# Patient Record
Sex: MALE | Race: WHITE | Employment: FULL TIME | URBAN - METROPOLITAN AREA
[De-identification: names, ages, dates, MRNs, and addresses within clinical notes are randomized per-mention and may not be internally consistent; named-entity substitution may affect disease eponyms.]

---

## 2018-09-07 ENCOUNTER — HOSPITAL ENCOUNTER (EMERGENCY)
Age: 28
Discharge: HOME | End: 2018-09-07
Attending: EMERGENCY MEDICINE
Payer: COMMERCIAL

## 2018-09-07 VITALS
SYSTOLIC BLOOD PRESSURE: 102 MMHG | DIASTOLIC BLOOD PRESSURE: 70 MMHG | WEIGHT: 130 LBS | OXYGEN SATURATION: 99 % | HEART RATE: 122 BPM | BODY MASS INDEX: 18.2 KG/M2 | HEIGHT: 71 IN | TEMPERATURE: 97.7 F | RESPIRATION RATE: 16 BRPM

## 2018-09-07 DIAGNOSIS — K04.7 DENTAL INFECTION: Primary | ICD-10-CM

## 2018-09-07 DIAGNOSIS — J06.9 ACUTE UPPER RESPIRATORY INFECTION: ICD-10-CM

## 2018-09-07 PROCEDURE — 99282 EMERGENCY DEPT VISIT SF MDM: CPT

## 2018-09-07 RX ORDER — BROMPHENIRAMINE MALEATE, PSEUDOEPHEDRINE HYDROCHLORIDE, AND DEXTROMETHORPHAN HYDROBROMIDE 2; 30; 10 MG/5ML; MG/5ML; MG/5ML
5 SYRUP ORAL 4 TIMES DAILY PRN
Qty: 120 ML | Refills: 0 | Status: SHIPPED | OUTPATIENT
Start: 2018-09-07 | End: 2019-03-06

## 2018-09-07 RX ORDER — IBUPROFEN 800 MG/1
800 TABLET ORAL EVERY 8 HOURS PRN
Qty: 30 TABLET | Refills: 0 | Status: SHIPPED | OUTPATIENT
Start: 2018-09-07 | End: 2019-06-12

## 2018-09-07 RX ORDER — AMOXICILLIN 500 MG/1
500 CAPSULE ORAL 3 TIMES DAILY
Qty: 30 CAPSULE | Refills: 0 | Status: SHIPPED | OUTPATIENT
Start: 2018-09-07 | End: 2018-09-17

## 2018-09-07 ASSESSMENT — PAIN DESCRIPTION - PAIN TYPE: TYPE: ACUTE PAIN

## 2018-09-07 ASSESSMENT — ENCOUNTER SYMPTOMS
COUGH: 1
SORE THROAT: 0
VOMITING: 0
ABDOMINAL PAIN: 0
DIARRHEA: 0
WHEEZING: 0
SHORTNESS OF BREATH: 0
RHINORRHEA: 1
EYE DISCHARGE: 0
BACK PAIN: 0
EYE REDNESS: 0
SINUS PRESSURE: 0
NAUSEA: 0
EYE PAIN: 0

## 2018-09-07 ASSESSMENT — PAIN DESCRIPTION - LOCATION: LOCATION: TEETH

## 2018-09-07 ASSESSMENT — PAIN DESCRIPTION - DESCRIPTORS: DESCRIPTORS: ACHING

## 2018-09-07 ASSESSMENT — PAIN DESCRIPTION - ORIENTATION: ORIENTATION: LEFT

## 2018-09-07 ASSESSMENT — PAIN SCALES - GENERAL: PAINLEVEL_OUTOF10: 5

## 2018-09-07 NOTE — ED PROVIDER NOTES
The history is provided by the patient. URI   Presenting symptoms: congestion, cough and rhinorrhea    Presenting symptoms: no ear pain, no fever and no sore throat    Severity:  Moderate  Onset quality:  Gradual  Duration:  2 weeks  Chronicity:  New  Associated symptoms: no arthralgias, no headaches and no wheezing    Dental Pain   Location:  Upper  Upper teeth location:  15/ILAN 2nd molar and 16/ILAN 3rd molar  Quality:  Aching  Severity:  Moderate  Onset quality:  Gradual  Duration:  3 weeks  Progression:  Worsening  Context: abscess    Associated symptoms: congestion    Associated symptoms: no fever and no headaches        Review of Systems   Constitutional: Negative for chills and fever. HENT: Positive for congestion and rhinorrhea. Negative for ear pain, sinus pressure and sore throat. Eyes: Negative for pain, discharge and redness. Respiratory: Positive for cough. Negative for shortness of breath and wheezing. Cardiovascular: Negative for chest pain. Gastrointestinal: Negative for abdominal pain, diarrhea, nausea and vomiting. Genitourinary: Negative for dysuria and frequency. Musculoskeletal: Negative for arthralgias and back pain. Skin: Negative for rash and wound. Neurological: Negative for weakness and headaches. Hematological: Negative for adenopathy. All other systems reviewed and are negative. Physical Exam   Constitutional: He is oriented to person, place, and time. He appears well-developed and well-nourished. HENT:   Head: Normocephalic and atraumatic. Right Ear: Hearing, tympanic membrane and external ear normal.   Left Ear: Hearing, tympanic membrane and external ear normal.   Nose: Mucosal edema and rhinorrhea present. Right sinus exhibits no maxillary sinus tenderness and no frontal sinus tenderness. Left sinus exhibits no maxillary sinus tenderness and no frontal sinus tenderness.    Mouth/Throat: Uvula is midline, oropharynx is clear and moist and mucous notes within the ED encounter and vital signs as below have been reviewed. /70   Pulse 122   Temp 97.7 °F (36.5 °C)   Resp 16   Ht 5' 11\" (1.803 m)   Wt 130 lb (59 kg)   SpO2 99%   BMI 18.13 kg/m²   Oxygen Saturation Interpretation: Normal      ------------------------------------------ PROGRESS NOTES ------------------------------------------  I have spoken with the patient and discussed todays results, in addition to providing specific details for the plan of care and counseling regarding the diagnosis and prognosis. Their questions are answered at this time and they are agreeable with the plan. I discussed at length with them reasons for immediate return here for re evaluation. They will followup with primary care by calling their office tomorrow. --------------------------------- ADDITIONAL PROVIDER NOTES ---------------------------------  At this time the patient is without objective evidence of an acute process requiring hospitalization or inpatient management. They have remained hemodynamically stable throughout their entire ED visit and are stable for discharge with outpatient follow-up. The plan has been discussed in detail and they are aware of the specific conditions for emergent return, as well as the importance of follow-up. New Prescriptions    AMOXICILLIN (AMOXIL) 500 MG CAPSULE    Take 1 capsule by mouth 3 times daily for 10 days    BROMPHENIRAMINE-PSEUDOEPHEDRINE-DM 30-2-10 MG/5ML SYRUP    Take 5 mLs by mouth 4 times daily as needed for Congestion or Cough    IBUPROFEN (IBU) 800 MG TABLET    Take 1 tablet by mouth every 8 hours as needed for Pain       Diagnosis:  1. Dental infection    2. Acute upper respiratory infection        Disposition:  Patient's disposition: Discharge to home  Patient's condition is stable.          Ravindra Osborne MD  09/07/18 [id number]

## 2018-11-26 ENCOUNTER — HOSPITAL ENCOUNTER (EMERGENCY)
Age: 28
Discharge: HOME | End: 2018-11-26

## 2018-11-26 VITALS
BODY MASS INDEX: 19.22 KG/M2 | OXYGEN SATURATION: 99 % | TEMPERATURE: 98.1 F | HEART RATE: 95 BPM | RESPIRATION RATE: 15 BRPM | WEIGHT: 137.3 LBS | SYSTOLIC BLOOD PRESSURE: 133 MMHG | DIASTOLIC BLOOD PRESSURE: 72 MMHG | HEIGHT: 71 IN

## 2018-11-26 DIAGNOSIS — R20.2 INTERMITTENT TINGLING SENSATION OF RIGHT HAND AND FOOT: Primary | ICD-10-CM

## 2018-11-26 PROCEDURE — 99283 EMERGENCY DEPT VISIT LOW MDM: CPT

## 2018-11-26 ASSESSMENT — PAIN DESCRIPTION - LOCATION: LOCATION: BACK

## 2018-11-26 ASSESSMENT — PAIN DESCRIPTION - PAIN TYPE: TYPE: CHRONIC PAIN

## 2018-11-26 ASSESSMENT — PAIN SCALES - GENERAL: PAINLEVEL_OUTOF10: 5

## 2018-11-26 NOTE — ED PROVIDER NOTES
[facility]     Department of Emergency Medicine   ED  Provider Note  Admit Date/RoomTime: 11/26/2018  5:48 PM  ED Room: [place] [address]   Chief Complaint:   Back Pain (chronic back pain, dx with sciatica) and Numbness (right hand x 1 week)    History of Present Illness   Source of history provided by:  patient. History/Exam Limitations: none. Leslie Swanson is a 29 y.o. old male who has a past medical history of:   Past Medical History:   Diagnosis Date    Bee sting allergy     presents to the emergency department by private vehicle, for acute, like an electric shock right sided upper extremity pain with radiation, right hand, for 1 week(s) prior to arrival. The pain was caused by no known injury, but states that he notices it most when he is in the cold environment at work. He is a smoker as well. No known history of Raynaud's. He has a history of Chronic back pain issues with sciatica of the lumbar spine but no known cervical disc disease or cervical spinal abnormalities. No recent head injury or trauma, neck pain or trauma. No focal or unilateral weakness. No acute vision, speech, hearing changes, No issues with swallowing, balance or gait. Since onset the symptoms have been intermittent and mild in severity. No recent or prior history of back surgery, interventional back procedures such as epidural or facet injections, fevers or chills, genital or perianal paresthesia, lower extremity paresthesia, incontinence of bowel or bladder, or history of IV drug use. There have been associated symptoms of tingling of hand and denies any weakness, numbness, incontinence, dysuria, hematuria, abdominal pain, fever, hx cancer, weight loss, recent steroid use, morning stiffness, leg weakness, new numbness, new weakness, new tingling, abdominal swelling, chest pain, pelvic pain or perianal numbness. The the pain is aggraveated by cold temperatures and relieved by warmer temperatures.       ROS   Pertinent positives and negatives are stated within HPI, all other systems reviewed and are negative. History reviewed. No pertinent surgical history. Social History:  reports that he has been smoking Cigarettes. He has a 8.00 pack-year smoking history. He has never used smokeless tobacco. He reports that he drinks about 1.2 oz of alcohol per week . He reports that he does not use drugs. Family History: family history is not on file. Allergies: Patient has no known allergies. Physical Exam           ED Triage Vitals [11/26/18 1746]   BP Temp Temp src Pulse Resp SpO2 Height Weight   133/72 98.1 °F (36.7 °C) -- 95 15 99 % 5' 11\" (1.803 m) 137 lb 4.8 oz (62.3 kg)      Oxygen Saturation Interpretation: Normal.    Constitutional:  Alert, development consistent with age. HEENT:  NC/NT. No depressed skull fracture, scalp tenderness, laceration, hematoma or open wound. PERRLA. EOMI, sclera anicteric,  Airway patent. Neck:  Normal ROM. No posterior midline tenderness, no bilateral PSM tenderness. Supple. No meningeal signs/ negative Kernig and Brudzinski signs. Negative bilateral Spurling's exam.  Respiratory:  Clear to auscultation and breath sounds equal. Respiratory pattern regular easy and unlabored. CV:  Regular rate and rhythm, normal heart sounds, without pathological murmurs, ectopy, gallops, or rubs. GI:  Abdomen Soft, nontender, good bowel sounds. No firm or pulsatile mass. Back:  No costovertebral, paravertebral, intervertebral, or vertebral tenderness or spasm. Pelvis:  Non-tender, Stable to palpation. Extremities: Bilateral upper and lower extremities have normal neurovascular examinations with good distal pulses and capillary refill. Normal distal temperature. Normal bilateral Eduin's test. All reflexes are tested and intact, symmetrical in all extremities upper and lower No tenderness or edema noted. Integument:  Normal turgor. Warm, dry, without visible rash, unless noted elsewhere.   Lymphatics: No

## 2018-11-29 ENCOUNTER — HOSPITAL ENCOUNTER (OUTPATIENT)
Age: 28
Discharge: HOME | End: 2018-12-01

## 2018-11-29 ENCOUNTER — HOSPITAL ENCOUNTER (OUTPATIENT)
Dept: GENERAL RADIOLOGY | Age: 28
Discharge: HOME | End: 2018-12-01

## 2018-11-29 DIAGNOSIS — M54.12 CERVICAL RADICULITIS: ICD-10-CM

## 2018-11-29 PROCEDURE — 72040 X-RAY EXAM NECK SPINE 2-3 VW: CPT

## 2019-03-06 ENCOUNTER — HOSPITAL ENCOUNTER (EMERGENCY)
Age: 29
Discharge: HOME | End: 2019-03-07
Attending: EMERGENCY MEDICINE
Payer: MEDICARE

## 2019-03-06 DIAGNOSIS — F19.10 POLYSUBSTANCE ABUSE (HCC): ICD-10-CM

## 2019-03-06 DIAGNOSIS — F32.A DEPRESSION, UNSPECIFIED DEPRESSION TYPE: Primary | ICD-10-CM

## 2019-03-06 PROCEDURE — 99285 EMERGENCY DEPT VISIT HI MDM: CPT

## 2019-03-06 ASSESSMENT — ENCOUNTER SYMPTOMS
EYE REDNESS: 0
WHEEZING: 0
VOMITING: 0
SINUS PRESSURE: 0
SORE THROAT: 0
EYE DISCHARGE: 0
NAUSEA: 0
DIARRHEA: 0
BACK PAIN: 0
ABDOMINAL PAIN: 0
SHORTNESS OF BREATH: 0
COUGH: 0
EYE PAIN: 0

## 2019-03-07 VITALS
HEIGHT: 71 IN | SYSTOLIC BLOOD PRESSURE: 116 MMHG | BODY MASS INDEX: 19.6 KG/M2 | TEMPERATURE: 97.6 F | OXYGEN SATURATION: 99 % | WEIGHT: 140 LBS | HEART RATE: 89 BPM | RESPIRATION RATE: 18 BRPM | DIASTOLIC BLOOD PRESSURE: 72 MMHG

## 2019-03-07 LAB
ACETAMINOPHEN LEVEL: <5 MCG/ML (ref 10–30)
ALBUMIN SERPL-MCNC: 3.8 G/DL (ref 3.5–5.2)
ALP BLD-CCNC: 79 U/L (ref 40–129)
ALT SERPL-CCNC: 15 U/L (ref 0–40)
AMPHETAMINE SCREEN, URINE: POSITIVE
ANION GAP SERPL CALCULATED.3IONS-SCNC: 10 MMOL/L (ref 7–16)
AST SERPL-CCNC: 15 U/L (ref 0–39)
BACTERIA: NORMAL /HPF
BARBITURATE SCREEN URINE: NOT DETECTED
BASOPHILS ABSOLUTE: 0.03 E9/L (ref 0–0.2)
BASOPHILS RELATIVE PERCENT: 0.3 % (ref 0–2)
BENZODIAZEPINE SCREEN, URINE: POSITIVE
BILIRUB SERPL-MCNC: 0.5 MG/DL (ref 0–1.2)
BILIRUBIN URINE: NEGATIVE
BLOOD, URINE: NEGATIVE
BUN BLDV-MCNC: 12 MG/DL (ref 6–20)
CALCIUM SERPL-MCNC: 9.2 MG/DL (ref 8.6–10.2)
CANNABINOID SCREEN URINE: POSITIVE
CHLORIDE BLD-SCNC: 103 MMOL/L (ref 98–107)
CLARITY: CLEAR
CO2: 30 MMOL/L (ref 22–29)
COCAINE METABOLITE SCREEN URINE: NOT DETECTED
COLOR: YELLOW
CREAT SERPL-MCNC: 0.7 MG/DL (ref 0.7–1.2)
EOSINOPHILS ABSOLUTE: 0.17 E9/L (ref 0.05–0.5)
EOSINOPHILS RELATIVE PERCENT: 1.8 % (ref 0–6)
ETHANOL: <10 MG/DL (ref 0–0.08)
GFR AFRICAN AMERICAN: >60
GFR NON-AFRICAN AMERICAN: >60 ML/MIN/1.73
GLUCOSE BLD-MCNC: 106 MG/DL (ref 74–99)
GLUCOSE URINE: NEGATIVE MG/DL
HCT VFR BLD CALC: 49.2 % (ref 37–54)
HEMOGLOBIN: 16.2 G/DL (ref 12.5–16.5)
IMMATURE GRANULOCYTES #: 0.03 E9/L
IMMATURE GRANULOCYTES %: 0.3 % (ref 0–5)
KETONES, URINE: ABNORMAL MG/DL
LEUKOCYTE ESTERASE, URINE: NEGATIVE
LYMPHOCYTES ABSOLUTE: 2.08 E9/L (ref 1.5–4)
LYMPHOCYTES RELATIVE PERCENT: 21.6 % (ref 20–42)
MCH RBC QN AUTO: 29.7 PG (ref 26–35)
MCHC RBC AUTO-ENTMCNC: 32.9 % (ref 32–34.5)
MCV RBC AUTO: 90.3 FL (ref 80–99.9)
METHADONE SCREEN, URINE: NOT DETECTED
MONOCYTES ABSOLUTE: 0.49 E9/L (ref 0.1–0.95)
MONOCYTES RELATIVE PERCENT: 5.1 % (ref 2–12)
MUCUS: PRESENT
NEUTROPHILS ABSOLUTE: 6.84 E9/L (ref 1.8–7.3)
NEUTROPHILS RELATIVE PERCENT: 70.9 % (ref 43–80)
NITRITE, URINE: NEGATIVE
OPIATE SCREEN URINE: NOT DETECTED
PDW BLD-RTO: 11.4 FL (ref 11.5–15)
PH UA: 6 (ref 5–9)
PHENCYCLIDINE SCREEN URINE: NOT DETECTED
PLATELET # BLD: 179 E9/L (ref 130–450)
PMV BLD AUTO: 8.8 FL (ref 7–12)
POTASSIUM SERPL-SCNC: 3.4 MMOL/L (ref 3.5–5)
PROPOXYPHENE SCREEN: NOT DETECTED
PROTEIN UA: ABNORMAL MG/DL
RBC # BLD: 5.45 E12/L (ref 3.8–5.8)
RBC UA: NORMAL /HPF (ref 0–2)
SALICYLATE, SERUM: <0.3 MG/DL (ref 0–30)
SODIUM BLD-SCNC: 143 MMOL/L (ref 132–146)
SPECIFIC GRAVITY UA: >=1.03 (ref 1–1.03)
T4 TOTAL: 7.1 MCG/DL (ref 4.5–11.7)
TOTAL PROTEIN: 6.4 G/DL (ref 6.4–8.3)
TRICYCLIC ANTIDEPRESSANTS SCREEN SERUM: NEGATIVE NG/ML
TSH SERPL DL<=0.05 MIU/L-ACNC: 1.5 UIU/ML (ref 0.27–4.2)
UROBILINOGEN, URINE: 0.2 E.U./DL
WBC # BLD: 9.6 E9/L (ref 4.5–11.5)
WBC UA: NORMAL /HPF (ref 0–5)

## 2019-03-07 PROCEDURE — G0480 DRUG TEST DEF 1-7 CLASSES: HCPCS

## 2019-03-07 PROCEDURE — 84443 ASSAY THYROID STIM HORMONE: CPT

## 2019-03-07 PROCEDURE — 80053 COMPREHEN METABOLIC PANEL: CPT

## 2019-03-07 PROCEDURE — 36415 COLL VENOUS BLD VENIPUNCTURE: CPT

## 2019-03-07 PROCEDURE — 80307 DRUG TEST PRSMV CHEM ANLYZR: CPT

## 2019-03-07 PROCEDURE — 84436 ASSAY OF TOTAL THYROXINE: CPT

## 2019-03-07 PROCEDURE — 81001 URINALYSIS AUTO W/SCOPE: CPT

## 2019-03-07 PROCEDURE — 85025 COMPLETE CBC W/AUTO DIFF WBC: CPT

## 2019-03-10 LAB
7-AMINOCLONAZEPAM, URINE: <5 NG/ML
ALPHA-HYDROXYALPRAZOLAM, URINE: <5 NG/ML
ALPHA-HYDROXYMIDAZOLAM, URINE: <20 NG/ML
ALPRAZOLAM, URINE: <5 NG/ML
CHLORDIAZEPOXIDE, URINE: <20 NG/ML
CLONAZEPAM, URINE: <5 NG/ML
DIAZEPAM, URINE: <20 NG/ML
LORAZEPAM, URINE: <20 NG/ML
MIDAZOLAM, URINE: <20 NG/ML
NORDIAZEPAM, URINE: 1450 NG/ML
OXAZEPAM, URINE: >4000 NG/ML
TEMAZEPAM, URINE: 3508 NG/ML

## 2019-03-11 LAB — CANNABINOIDS CONF, URINE: 73 NG/ML

## 2019-03-12 LAB
AMPHETAMINES, URINE: >5000 NG/ML
METHAMPHETAMINE, URINE: NORMAL NG/ML
METHYLENEDIOXYAMPHETAMINE, UR: <200 NG/ML
METHYLENEDIOXYETHYLAMPHETAMINE, UR: <200 NG/ML
METHYLENEDIOXYMETHAMPHETAMINE, UR: <200 NG/ML

## 2019-03-15 ENCOUNTER — HOSPITAL ENCOUNTER (EMERGENCY)
Age: 29
Discharge: HOME | End: 2019-03-15
Attending: EMERGENCY MEDICINE
Payer: MEDICARE

## 2019-03-15 VITALS
TEMPERATURE: 98.1 F | DIASTOLIC BLOOD PRESSURE: 82 MMHG | BODY MASS INDEX: 20.22 KG/M2 | OXYGEN SATURATION: 100 % | WEIGHT: 145 LBS | SYSTOLIC BLOOD PRESSURE: 122 MMHG | RESPIRATION RATE: 18 BRPM | HEART RATE: 88 BPM

## 2019-03-15 DIAGNOSIS — J02.9 ACUTE PHARYNGITIS, UNSPECIFIED ETIOLOGY: Primary | ICD-10-CM

## 2019-03-15 DIAGNOSIS — K02.9 PAIN DUE TO DENTAL CARIES: ICD-10-CM

## 2019-03-15 PROCEDURE — 99282 EMERGENCY DEPT VISIT SF MDM: CPT

## 2019-03-15 RX ORDER — CEPHALEXIN 500 MG/1
500 CAPSULE ORAL 4 TIMES DAILY
Qty: 40 CAPSULE | Refills: 0 | Status: SHIPPED | OUTPATIENT
Start: 2019-03-15 | End: 2019-03-25

## 2019-03-15 RX ORDER — IBUPROFEN 800 MG/1
800 TABLET ORAL EVERY 8 HOURS PRN
Qty: 21 TABLET | Refills: 0 | Status: SHIPPED | OUTPATIENT
Start: 2019-03-15 | End: 2019-06-12

## 2019-03-15 ASSESSMENT — ENCOUNTER SYMPTOMS
FACIAL SWELLING: 1
EYES NEGATIVE: 1
GASTROINTESTINAL NEGATIVE: 1
ALLERGIC/IMMUNOLOGIC NEGATIVE: 1
RESPIRATORY NEGATIVE: 1

## 2019-03-15 ASSESSMENT — PAIN SCALES - GENERAL
PAINLEVEL_OUTOF10: 7
PAINLEVEL_OUTOF10: 7

## 2019-03-15 ASSESSMENT — PAIN DESCRIPTION - LOCATION: LOCATION: THROAT

## 2019-03-15 ASSESSMENT — PAIN DESCRIPTION - PROGRESSION: CLINICAL_PROGRESSION: GRADUALLY WORSENING

## 2019-03-15 ASSESSMENT — PAIN DESCRIPTION - DESCRIPTORS: DESCRIPTORS: SORE

## 2019-03-15 ASSESSMENT — PAIN - FUNCTIONAL ASSESSMENT: PAIN_FUNCTIONAL_ASSESSMENT: 0-10

## 2019-03-15 ASSESSMENT — PAIN DESCRIPTION - ONSET: ONSET: GRADUAL

## 2019-03-15 ASSESSMENT — PAIN DESCRIPTION - PAIN TYPE: TYPE: ACUTE PAIN

## 2019-03-18 ENCOUNTER — HOSPITAL ENCOUNTER (OUTPATIENT)
Dept: PSYCHIATRY | Age: 29
Setting detail: THERAPIES SERIES
Discharge: HOME | End: 2019-03-18
Payer: MEDICARE

## 2019-03-18 VITALS
SYSTOLIC BLOOD PRESSURE: 115 MMHG | HEIGHT: 71 IN | DIASTOLIC BLOOD PRESSURE: 79 MMHG | BODY MASS INDEX: 21 KG/M2 | HEART RATE: 100 BPM | RESPIRATION RATE: 16 BRPM | WEIGHT: 150 LBS | TEMPERATURE: 98.5 F

## 2019-03-18 PROCEDURE — 80305 DRUG TEST PRSMV DIR OPT OBS: CPT

## 2019-03-18 ASSESSMENT — LIFESTYLE VARIABLES: HISTORY_ALCOHOL_USE: YES

## 2019-03-18 ASSESSMENT — PAIN SCALES - GENERAL: PAINLEVEL_OUTOF10: 0

## 2019-03-20 ENCOUNTER — HOSPITAL ENCOUNTER (OUTPATIENT)
Dept: PSYCHIATRY | Age: 29
Setting detail: THERAPIES SERIES
Discharge: HOME | End: 2019-03-20
Payer: MEDICARE

## 2019-03-20 PROCEDURE — 80305 DRUG TEST PRSMV DIR OPT OBS: CPT

## 2019-03-20 PROCEDURE — H0015 ALCOHOL AND/OR DRUG SERVICES: HCPCS

## 2019-03-21 ENCOUNTER — HOSPITAL ENCOUNTER (OUTPATIENT)
Dept: PSYCHIATRY | Age: 29
Setting detail: THERAPIES SERIES
Discharge: HOME | End: 2019-03-21
Payer: MEDICARE

## 2019-03-21 PROCEDURE — H0015 ALCOHOL AND/OR DRUG SERVICES: HCPCS

## 2019-03-25 ENCOUNTER — HOSPITAL ENCOUNTER (OUTPATIENT)
Dept: PSYCHIATRY | Age: 29
Setting detail: THERAPIES SERIES
Discharge: HOME | End: 2019-03-25
Payer: MEDICARE

## 2019-03-25 PROCEDURE — H0015 ALCOHOL AND/OR DRUG SERVICES: HCPCS

## 2019-03-25 PROCEDURE — 99222 1ST HOSP IP/OBS MODERATE 55: CPT | Performed by: FAMILY MEDICINE

## 2019-03-25 PROCEDURE — 80305 DRUG TEST PRSMV DIR OPT OBS: CPT

## 2019-03-27 ENCOUNTER — HOSPITAL ENCOUNTER (OUTPATIENT)
Dept: PSYCHIATRY | Age: 29
Setting detail: THERAPIES SERIES
Discharge: HOME | End: 2019-03-27
Payer: MEDICARE

## 2019-03-27 PROCEDURE — H0015 ALCOHOL AND/OR DRUG SERVICES: HCPCS

## 2019-03-28 ENCOUNTER — HOSPITAL ENCOUNTER (OUTPATIENT)
Dept: PSYCHIATRY | Age: 29
Setting detail: THERAPIES SERIES
Discharge: HOME | End: 2019-03-28
Payer: MEDICARE

## 2019-03-28 PROCEDURE — H0015 ALCOHOL AND/OR DRUG SERVICES: HCPCS

## 2019-03-29 ENCOUNTER — HOSPITAL ENCOUNTER (EMERGENCY)
Age: 29
Discharge: HOME | End: 2019-03-29
Attending: EMERGENCY MEDICINE
Payer: MEDICARE

## 2019-03-29 VITALS
WEIGHT: 145 LBS | BODY MASS INDEX: 20.22 KG/M2 | TEMPERATURE: 98.2 F | RESPIRATION RATE: 20 BRPM | HEART RATE: 100 BPM | SYSTOLIC BLOOD PRESSURE: 106 MMHG | DIASTOLIC BLOOD PRESSURE: 73 MMHG | OXYGEN SATURATION: 99 %

## 2019-03-29 DIAGNOSIS — J06.9 ACUTE UPPER RESPIRATORY INFECTION: Primary | ICD-10-CM

## 2019-03-29 PROCEDURE — 99283 EMERGENCY DEPT VISIT LOW MDM: CPT

## 2019-03-29 RX ORDER — BROMPHENIRAMINE MALEATE, PSEUDOEPHEDRINE HYDROCHLORIDE, AND DEXTROMETHORPHAN HYDROBROMIDE 2; 30; 10 MG/5ML; MG/5ML; MG/5ML
5 SYRUP ORAL 4 TIMES DAILY PRN
Qty: 120 ML | Refills: 0 | Status: SHIPPED | OUTPATIENT
Start: 2019-03-29 | End: 2019-04-08

## 2019-03-29 ASSESSMENT — PAIN DESCRIPTION - LOCATION: LOCATION: THROAT

## 2019-03-29 ASSESSMENT — PAIN SCALES - GENERAL
PAINLEVEL_OUTOF10: 5
PAINLEVEL_OUTOF10: 5

## 2019-03-29 ASSESSMENT — ENCOUNTER SYMPTOMS
SINUS PRESSURE: 1
COUGH: 1
GASTROINTESTINAL NEGATIVE: 1
SINUS PAIN: 1
RHINORRHEA: 1
FACIAL SWELLING: 1

## 2019-03-29 ASSESSMENT — PAIN - FUNCTIONAL ASSESSMENT: PAIN_FUNCTIONAL_ASSESSMENT: 0-10

## 2019-03-29 ASSESSMENT — PAIN DESCRIPTION - PAIN TYPE: TYPE: ACUTE PAIN

## 2019-03-29 ASSESSMENT — PAIN DESCRIPTION - ONSET: ONSET: AWAKENED FROM SLEEP

## 2019-03-29 ASSESSMENT — PAIN DESCRIPTION - DESCRIPTORS: DESCRIPTORS: SORE

## 2019-03-29 ASSESSMENT — PAIN DESCRIPTION - FREQUENCY: FREQUENCY: CONTINUOUS

## 2019-04-01 ENCOUNTER — HOSPITAL ENCOUNTER (OUTPATIENT)
Dept: PSYCHIATRY | Age: 29
Setting detail: THERAPIES SERIES
Discharge: HOME | End: 2019-04-01
Payer: MEDICARE

## 2019-04-01 PROCEDURE — 80305 DRUG TEST PRSMV DIR OPT OBS: CPT

## 2019-04-01 PROCEDURE — H0015 ALCOHOL AND/OR DRUG SERVICES: HCPCS

## 2019-04-01 NOTE — PROGRESS NOTES
Group Therapy Note    Date: 4/1/2019   Start Time: 9:00AM   End Time: 12:00  Number of Participants: 8  Support Meetings attended:2     Type of Group: [facility]       Topic: Group 1: Group psychotherapy- Feelings of Grief               Group 2: Self -care and impact on substance addiction         Patient's Goal:  Maintain abstinence/ Develop sober support / Manage cravings & urges.     Group 1 9:00AM to 10 :30 AM :  Client actively engaged by opening up by sharing he currently has feelings of loss because he can't see his daughter right now and he shared that he has had thoughts of using recently and he processed in group where using will lead him and he was suggested to call his sponsor,  go to a meeting to aid his sobriety.     Group 2 10:50 to 12:00 : Client actively engaged in group discussion exploring a handout and identifying how not seeking professional help for her mental osmani as a self-care issue led to substance abuse . Client shared and identified that he needs to follow through with counseling appointments and be ore open with expressing his thoughts and feelings to aid his sobriety. Urinalysis Submitted Today? Yes    Urinalysis Results: 0    If Positive, for which substance:0    Date of Urinalysis Results Review: 0     Urinalysis Results Signed by Client? No    Status After Intervention:  Improved    Participation Level:  Active Listener and Interactive    Participation Quality: Appropriate, Attentive and Sharing      Speech:  normal      Thought Process/Content: Logical      Affective Functioning: Congruent      Mood: anxious and irritable      Level of consciousness:  Alert, Oriented x4 and Attentive      Response to Learning: Able to verbalize current knowledge/experience, Able to verbalize/acknowledge new learning, Able to retain information, Capable of insight, Able to change behavior and Progressing to goal      Endings: None Reported    Modes of Intervention: Support, Socialization, Exploration, Clarifying and Problem-solving      Discipline Responsible: [facility][note 3]      Signature:  Robi Mcnamara

## 2019-04-03 ENCOUNTER — HOSPITAL ENCOUNTER (OUTPATIENT)
Dept: PSYCHIATRY | Age: 29
Setting detail: THERAPIES SERIES
Discharge: HOME | End: 2019-04-03
Payer: MEDICARE

## 2019-04-03 PROCEDURE — H0015 ALCOHOL AND/OR DRUG SERVICES: HCPCS

## 2019-04-03 NOTE — PROGRESS NOTES
Group Therapy Note    Date: 4/3/2019   Start Time: 9:00AM   End Time: 12:15  Number of Participants: 7  Support Meetings attended:0     Type of Group: [facility]      Topic: Group 1: Coping focused Psychotherapy- Processing Cravings & Cornelius Schmitt               Group 2: Triggers          Patient's Goal:  Maintain abstinence/ Develop sober support / Manage cravings & urges/ Eliminate legal problems from [place]. Group 1:9:00 AM-10:30 Client actively participated in group therapy, he reported no craving or urges despite facing triggering persons in the last 24 hours. He participated by sharing insight regarding spiritual awakenings regarding patterns of use. Group 2 : 10:50-12:15- Client active in group reports feelings of anger trigger his desire to use. Identified healthy coping skills of art therapy, deep breathing and meditation. Urinalysis Submitted Today? No    Urinalysis Results: 0    If Positive, for which substance:0    Date of Urinalysis Results Review: 0     Urinalysis Results Signed by Client? No    Status After Intervention:  Improved    Participation Level: Active Listener    Participation Quality: Appropriate      Speech:  normal      Thought Process/Content: Logical      Affective Functioning: Congruent      Mood: euthymic      Level of consciousness:  Oriented x4      Response to Learning: Able to verbalize current knowledge/experience      Endings: None Reported    Modes of Intervention: Education, Support, Socialization, Exploration, Clarifying, Problem-solving, Media and Confrontation      Discipline Responsible: /Counselor      Signature:  Electronically signed by Davi Miles on 4/3/2019 at 3:31 PM

## 2019-04-04 ENCOUNTER — HOSPITAL ENCOUNTER (OUTPATIENT)
Dept: PSYCHIATRY | Age: 29
Setting detail: THERAPIES SERIES
Discharge: HOME | End: 2019-04-04
Payer: MEDICARE

## 2019-04-04 PROCEDURE — H0015 ALCOHOL AND/OR DRUG SERVICES: HCPCS

## 2019-04-04 NOTE — PROGRESS NOTES
Group Therapy Note    Date: 4/4/2019   Start Time: 9:00   End Time: 12:15  Number of Participants: 7  Support Meetings attended:1     Type of Group: IOP      Topic: Group 1: Interpersonal Group psychotherapy                Group  2: AA Fellowship and developing sober support      Patient's Goal:  Maintain abstinence/ Develop sober support / Manage cravings & urges/ Eliminate legal problems from [place].     Group 1 9:00AM to 10:30AM: Client reported continued sobriety explored that he is feeling good emotionally. He states his sobriety is helping him to gain more control over his decision making and shared how his continued sobriety will lead to being a better father for his daughter and he stated \" I'm not giving up on my future with my daughter\".      Group 2  10:50 to 12:15: Client actively engaged in group session and he identified  and explored from media education how sober support has dramatically changed his life for there better and he explored how sober living and on -going contact with his sponsor has helped him to not  each day. Urinalysis Submitted Today? No    Urinalysis Results: 0    If Positive, for which substance:0    Date of Urinalysis Results Review: 0     Urinalysis Results Signed by Client? No    Status After Intervention:  Improved    Participation Level:  Active Listener and Interactive    Participation Quality: Appropriate, Attentive and Sharing      Speech:  normal      Thought Process/Content: Logical      Affective Functioning: Congruent      Mood: Stable       Level of consciousness:  Alert, Oriented x4 and Attentive      Response to Learning: Able to verbalize current knowledge/experience, Able to verbalize/acknowledge new learning, Able to retain information, Capable of insight, Able to change behavior and Progressing to goal      Endings: None Reported    Modes of Intervention: Education, Support, Socialization, Exploration, Clarifying, Problem-solving and Media      Discipline Responsible: [facility]      Signature:  Robi Moreland

## 2019-04-08 ENCOUNTER — HOSPITAL ENCOUNTER (OUTPATIENT)
Dept: PSYCHIATRY | Age: 29
Setting detail: THERAPIES SERIES
Discharge: HOME | End: 2019-04-08
Payer: MEDICARE

## 2019-04-08 PROCEDURE — H0015 ALCOHOL AND/OR DRUG SERVICES: HCPCS

## 2019-04-08 NOTE — PROGRESS NOTES
Group Therapy Note    Date: 4/8/2019   Start Time: 0900 AM    End Time: 1015 am  Number of Participants: 7  Support Meetings attended:2    Type of Group: [facility]     Topic:  Interpersonal Group Psychotherapy    Patient's Goal:  Maintain abstinence/ Develop sober support / Manage cravings & urges/ Eliminate legal problems from [place]. Content of therapy: Brannon Segal actively participated in group therapy. Adolfo's demeanor and affective were polite and pleasant and cooperative. He discussed new employment and added stress and ways to utilize coping skills of his support system rather than use of any substances. Urinalysis Submitted Today? No    Urinalysis Results: 0      If Positive, for which substance:0    Date of Urinalysis Results Review: 0     Urinalysis Results Signed by Client? No    Status After Intervention:  Improved    Participation Level: Active Listener and Interactive    Participation Quality: Appropriate, Attentive, Sharing and Supportive      Speech:  normal      Thought Process/Content: Logical  Linear      Affective Functioning: Congruent      Mood: euthymic      Level of consciousness:  Alert, Oriented x4 and Attentive        Response to Learning: Able to verbalize current knowledge/experience, Able to verbalize/acknowledge new learning, Able to retain information, Capable of insight and Able to change behavior      Endings: None Reported    Modes of Intervention: Education, Support, Socialization, Exploration, Clarifying, Problem-solving, Limit-setting and Reality-testing      Discipline Responsible: /Counselor    Electronically signed by Ignacio Meyer on 4/8/2019 at 2:23 PM

## 2019-04-08 NOTE — PROGRESS NOTES
Group Therapy Note    Date: 4/8/2019   Start Time: 10:30    End Time: 12:00  Number of Participants: 7  Support Meetings attended:0     Type of Group: IOP      Topic: Identifying harmful effects of substance abuse on family      Patient's Goal:  Maintain abstinence/ Develop sober support / Manage cravings & urges     Martin actively engaged in group therapy and media education that was provided. He identified and shared that his drug use caused worry and emotional stress to his mother and he related to the education and shared that his mother brennan be all night waiting to hear from him thinking the worse. He seemed to be remorseful when describing how his substance use impacted his mother. Client's demeanor was approproate , pleasant , and open to feedback. Urinalysis Submitted Today? No    Urinalysis Results: 0    If Positive, for which substance:0    Date of Urinalysis Results Review: 0     Urinalysis Results Signed by Client? No    Status After Intervention:  Improved    Participation Level:  Active Listener and Interactive    Participation Quality: Appropriate, Attentive and Sharing      Speech:  normal      Thought Process/Content: Logical      Affective Functioning: Congruent      Mood: Stable       Level of consciousness:  Alert, Oriented x4 and Attentive      Response to Learning: Able to verbalize current knowledge/experience, Able to verbalize/acknowledge new learning, Able to retain information, Capable of insight, Able to change behavior and Progressing to goal      Endings: None Reported    Modes of Intervention: Education, Support, Exploration, Clarifying, Problem-solving, Media and Confrontation      Discipline Responsible: [facility]      Signature:  Robi Snider

## 2019-04-10 ENCOUNTER — HOSPITAL ENCOUNTER (OUTPATIENT)
Dept: PSYCHIATRY | Age: 29
Setting detail: THERAPIES SERIES
Discharge: HOME | End: 2019-04-10
Payer: MEDICARE

## 2019-04-10 PROCEDURE — H0015 ALCOHOL AND/OR DRUG SERVICES: HCPCS

## 2019-04-10 NOTE — PROGRESS NOTES
Group Therapy Note  Date: 4/8/2019   Start Time: 1050AM  End Time: 1215AM  Number of Participants: 6  Support Meetings attended:2    Type of Group: IOP      Topic: Anger and Coping skills in recovery     Patient's Goal:  Maintain abstinence/ Develop sober support / Manage cravings & urges/ Eliminate legal problems from [place]. Content of therapy: Deisi Gutierres actively listened in group therapy he provided supportive feedback. He identified and explored anger and how to cope with feelings of anger. He gave 2 examples of experiencing anger one with a positive coping skill and one with a negative reaction. Urinalysis Submitted Today? NO     Urinalysis Results: 0    If Positive, for which substance:0      Date of Urinalysis Results Review: 0     Urinalysis Results Signed by Client? No    Status After Intervention:  Improved    Participation Level: Active Listener and Interactive    Participation Quality: Appropriate, Attentive, Sharing and Supportive      Speech:  normal      Thought Process/Content: Logical  Linear      Affective Functioning: Congruent      Mood: euthymic      Level of consciousness:  Alert, Oriented x4 and Attentive      Response to Learning: Able to verbalize current knowledge/experience, Able to verbalize/acknowledge new learning, Able to retain information, Capable of insight, Able to change behavior and Progressing to goal      Endings: None Reported    Modes of Intervention: Education, Support, Socialization, Exploration, Clarifying, Problem-solving, Confrontation and Limit-setting      Discipline Responsible: /Counselor    Electronically signed by Mirian Esteves on 4/10/2019 at 3:28 PM

## 2019-04-10 NOTE — PROGRESS NOTES
Group Therapy Note      Date: 4/10/2019   Start Time: 9:00   End Time: 10:30AM  Number of Participants: 7  Support Meetings attended:2     Type of Group: IOP      Topic: Group Psychotherapy:       Patient's Goal:  Maintain abstinence/ Develop sober support / Manage cravings & urges/ Relapse Prevention      Group 1 9:00AM to 10:30: Chaparrita Ayon actively engaged in discussion and he shared how having structure in his day with going to work, meetings, and making time for himself is making him feel more confident about how he manages his time. He compared his life to his past and explored how he had to much free time. Client identified how having structure is aiding his recovery by reducing feelings of boredom. Urinalysis Submitted Today? No    Urinalysis Results: 0    If Positive, for which substance:0    Date of Urinalysis Results Review: 0     Urinalysis Results Signed by Client? No    Status After Intervention:  Improved    Participation Level:  Active Listener and Interactive    Participation Quality: Appropriate, Attentive and Sharing      Speech:  normal      Thought Process/Content: Logical      Affective Functioning: Congruent      Mood: Stable       Level of consciousness:  Alert, Oriented x4 and Attentive      Response to Learning: Able to verbalize current knowledge/experience, Able to verbalize/acknowledge new learning, Able to retain information, Capable of insight, Able to change behavior and Progressing to goal      Endings: None Reported    Modes of Intervention: Support, Socialization, Exploration, Clarifying and Problem-solving      Discipline Responsible: [facility]      Signature:  Robi Goel [place]

## 2019-04-11 ENCOUNTER — HOSPITAL ENCOUNTER (OUTPATIENT)
Dept: PSYCHIATRY | Age: 29
Setting detail: THERAPIES SERIES
Discharge: HOME | End: 2019-04-11
Payer: MEDICARE

## 2019-04-11 PROCEDURE — 80306 DRUG TEST PRSMV INSTRMNT: CPT

## 2019-04-11 PROCEDURE — H0015 ALCOHOL AND/OR DRUG SERVICES: HCPCS

## 2019-04-11 PROCEDURE — 80305 DRUG TEST PRSMV DIR OPT OBS: CPT

## 2019-04-11 NOTE — PROGRESS NOTES
Group Therapy Note  Date: 4/8/2019   Start Time: 2828   End Time: 6213  Number of Participants: 6  Support Meetings attended:0    Type of Group: IOP     Topic: Co Dependency and Substance Use       Patient's Goal:  Maintain abstinence/ Develop sober support / Manage cravings & urges/ Eliminate legal problems from [place]. Content of therapy: Katherin Meadows actively participated in group therapy. He was pleasant and appropriate, he provided insight and explored feelings of co dependency with his ex wife and daughter. Coping skills reviewed. Urinalysis Submitted Today? Yes    Urinalysis Results: negative    If Positive, for which substance:0    Date of Urinalysis Results Review: 4/1/19     Urinalysis Results Signed by Client? Yes    Status After Intervention:  Improved    Participation Level: Active Listener and Interactive    Participation Quality: Appropriate, Attentive, Sharing and Supportive      Speech:  normal      Thought Process/Content: Logical  Linear      Affective Functioning: Congruent      Mood: euthymic      Level of consciousness:  Alert, Oriented x4 and Attentive      Response to Learning: Able to verbalize current knowledge/experience, Able to verbalize/acknowledge new learning, Able to retain information, Capable of insight, Able to change behavior and Progressing to goal      Endings: None Reported    Modes of Intervention: Education, Support, Socialization, Exploration, Clarifying, Problem-solving, Confrontation, Limit-setting and Reality-testing      Discipline Responsible: /Counselor    Electronically signed by Davi Miles on 4/11/2019 at 2:27 PM

## 2019-04-11 NOTE — PROGRESS NOTES
Group Therapy Note    Date: 4/8/2019   Start Time: 9:00   End Time:  10:15  Number of Participants: 6  Support Meetings attended:0     Type of Group: IOP      Topic: Group Psychotherapy      Patient's Goal:  Maintain abstinence/ Develop sober support / Manage cravings & urges/ Relapse prevention     Content of therapy: Adolfo actively participated in therapy today, he explored and provided insight into how his substance use has caused l impacted his relationship with his 10year old daughter and he shared how sobriety is helping him to be more present with his daughter and shared that he will be able to see her this weekend. He was appropriate  and participated. Urinalysis Submitted Today? Yes    Urinalysis Results: negative    If Positive, for which substance:0    Date of Urinalysis Results Review: 4/1/2019      Urinalysis Results Signed by Client? Yes    Status After Intervention:  Improved    Participation Level:  Active Listener and Interactive    Participation Quality: Appropriate, Attentive and Sharing      Speech:  normal      Thought Process/Content: Logical      Affective Functioning: Congruent      Mood: Stable       Level of consciousness:  Alert, Oriented x4 and Attentive      Response to Learning: Able to verbalize current knowledge/experience, Able to verbalize/acknowledge new learning, Able to retain information, Capable of insight, Able to change behavior and Progressing to goal      Endings: None Reported    Modes of Intervention: Support, Exploration, Clarifying and Problem-solving      Discipline Responsible: [facility]      Signature:  Robi Méndez

## 2019-04-15 ENCOUNTER — HOSPITAL ENCOUNTER (OUTPATIENT)
Dept: PSYCHIATRY | Age: 29
Setting detail: THERAPIES SERIES
Discharge: HOME | End: 2019-04-15
Payer: MEDICARE

## 2019-04-15 PROCEDURE — H0015 ALCOHOL AND/OR DRUG SERVICES: HCPCS

## 2019-04-15 NOTE — PROGRESS NOTES
Group Therapy Note    Date: 4/8/2019   Start Time: 0900 AM     End Time: 1030 AM  Number of Participants: 4  Support Meetings attended:0     Type of Group: IOP      Topic: Interpersonal psychotherapy    Patient's Goal:  Maintain abstinence/ Develop sober support / Manage cravings & urges/ Eliminate legal problems from CARL[place]. Content of therapy: Bran Kahn actively participated in group therapy today, he was pleasant and appropriate. Dionicio identified and explored triggers and discussed desire to use when his visitation with his daughter was delayed until next weekend. He reports and identifies 4 coping techniques utilized to avoid relapse. Urinalysis Submitted Today? No    Urinalysis Results: Yes    If Positive, for which substance:0    Date of Urinalysis Results Review: 4/11/19     Urinalysis Results Signed by Client? Yes    Status After Intervention:  Improved    Participation Level: Active Listener and Interactive    Participation Quality: Appropriate, Attentive and Supportive      Speech:  normal      Thought Process/Content: Logical  Linear      Affective Functioning: Congruent      Mood: euthymic      Level of consciousness:  Alert, Oriented x4 and Attentive      Response to Learning: Able to verbalize current knowledge/experience, Able to verbalize/acknowledge new learning, Able to retain information, Capable of insight, Able to change behavior and Progressing to goal      Endings: None Reported    Modes of Intervention: Education, Support, Socialization, Exploration, Clarifying, Problem-solving, Limit-setting and Reality-testing      Discipline Responsible: /Counselor    Electronically signed by Tim Pollack.  [place] on 4/15/2019 at 2:10 PM

## 2019-04-18 ENCOUNTER — HOSPITAL ENCOUNTER (OUTPATIENT)
Dept: PSYCHIATRY | Age: 29
Setting detail: THERAPIES SERIES
Discharge: HOME | End: 2019-04-18
Payer: MEDICARE

## 2019-04-18 PROCEDURE — H0015 ALCOHOL AND/OR DRUG SERVICES: HCPCS

## 2019-04-18 PROCEDURE — 80305 DRUG TEST PRSMV DIR OPT OBS: CPT

## 2019-04-18 NOTE — GROUP NOTE
Number of participants:7  Type of group: Psychotherapy  Mode of intervention: Education, Support, Socialization, Exploration, Clarifying, Problem-solving, Confrontation and Reality-testing  Topic: Relationships and Resolving conflicts   Objective: To assist client with developing communication skills and coping skills  to reduce conflict and repair relationships impacted by substance addiction. Note: Nisha Newton actively engaged in group session and he shared that he is looking forward to seeing his daughter for Jessi , and identified and processed how a return to substance use would have a negative impact his relationship with his daughter. Status after intervention:Improved  Participation level:  Active Listener and Interactive  Participation Quality: Appropriate, Attentive and Sharing  Speech: normal  Thought Process/Content:Logical  Mood/Affect:  congruent  Self report: None Reported  Response to learning: Able to verbalize current knowledge/experience, Able to verbalize/acknowledge new learning, Able to retain information, Capable of insight, Able to change behavior and Progressing to goal  Discipline Responsible: [place]

## 2019-04-18 NOTE — GROUP NOTE
Number of participants: 7  Type of group: Psychoeducation  Mode of intervention: Education, Support, Exploration, Clarifying, Problem-solving, Media, Confrontation and Reality-testing  Topic: Disease process of addiction and biological consequences  Objective: To educate and process biopsychosocial consequences and effects of substance abuse on relationships. Identify coping skills to gain or continue sobriety. Note: Mikhail Marrero actively participated in group and identified 1 consequence substance use has on physical well being and discussed the coping skill of sober support and how sponsorship prevents relapse. Status after intervention:Improved  Participation level:  Active Listener and Interactive  Participation Quality: Appropriate, Attentive and Sharing  Speech: normal  Thought Process/Content:Logical  Linear  Mood/Affect: brightens and congruent  Self report: None Reported  Response to learning: Able to verbalize current knowledge/experience, Able to verbalize/acknowledge new learning, Able to retain information, Capable of insight, Able to change behavior and Progressing to goal  Discipline Responsible: /Counselor

## 2019-04-22 ENCOUNTER — HOSPITAL ENCOUNTER (OUTPATIENT)
Dept: PSYCHIATRY | Age: 29
Setting detail: THERAPIES SERIES
Discharge: HOME | End: 2019-04-22
Payer: MEDICARE

## 2019-04-22 PROCEDURE — 80305 DRUG TEST PRSMV DIR OPT OBS: CPT

## 2019-04-22 PROCEDURE — H0015 ALCOHOL AND/OR DRUG SERVICES: HCPCS

## 2019-04-22 NOTE — GROUP NOTE
Number of participants: 6  Type of group: Psychotherapy  Mode of intervention: Education, Support, Socialization, Exploration, Clarifying, Problem-solving, Confrontation and Limit-setting  Topic: Interpersonal Psychotherapy  Objective: Identify and explore triggers and coping skills utilized to maintain sobriety. Note: Melchor Montenegro actively participated in group therapy. Melchor Montenegro identified and explored 3 positive changes from remaining abstinent from substance use. He provided insight into positive effects of treatment on his life. Pt has completed successfully the program and met treatment goals and will be transferred to Aftercare. Status after intervention:Improved  Participation level: Active Listener and Interactive  Participation Quality: Appropriate, Attentive, Sharing and Supportive  Speech: normal  Thought Process/Content:Logical  Linear  Mood/Affect: congruent  Self report: None Reported  Response to learning: Able to verbalize current knowledge/experience, Able to verbalize/acknowledge new learning, Able to retain information, Capable of insight, Able to change behavior and Progressing to goal  Discipline Responsible: /Counselor   Electronically signed by Geraldine Berry.  Joclein Cabello on 4/22/2019 at 11:30 AM

## 2019-04-23 NOTE — DISCHARGE SUMMARY
[address]      Client Name: Maynor Jamil  Date of Admission: 3/20/2019    Discharge Date: 4/22/2019      Diagnosis: F12.20, F13.20, F10.20,F11.20  Authorized Person's Name: Fatuma Ramsay          S63.253       License: BSN, RN          Date: 4/23/2019      Degree of Severity- ADMISSION  Degree of Severity- DISCHARGE    Acute Intoxication withdrawal None Acute Intoxication withdrawal None   Biomedical Conditions/  Complications None Biomedical Conditions/  Complications None   Emotional Behavioral/ Cognitive Conditions None Emotional Behavioral/ Cognitive Conditions None   Treatment Acceptance Resistance low Treatment Acceptance Resistance low   Relapse Potential low Relapse Potential low   Recovery Environment low Recovery Environment low       Comments on Dimensional Criteria: #1 No withdrawals reported on admission or discharge. #2 No Biomedical conditions reported. #3 Client reports no emotional conditions. #4 Client demonstrated low resistance to the program and he was able accept treatment and utilized the group process effectively. #5 Client has attended the (2) required AA/NA meetings for the treatment requirement and his risk for relapse is low due to client making good progress and demonstrating insight into his poor choices, developing coping skills, and obtaining sponsorship. #6 Clients environment is low due to client reports there is no substance use in his environment. Level of Care and Service Provided during Course of Treatment: Based upon the results of the assessment, which included completion of the admission criteria of the adult protocol for levels of care, client was placed into the IOP program. While involved in IOP the following services were provided: case management, urine drug screening, medical somatic and group counseling.       Client's Response to Treatment: Client responded well to the individualized treatment plan that was developed. Client attended the sessions as required, developed skills to make healthy choices to avoid abusing substances and avoid all illicit substance use. Client is still seeking a sponsor and working to increase his sober supports being active in the recovery community. Recommendations and/or Referral for Additional AOD Addiction Treatment or other services: Client met [facility] protocol for transfer to non-intensive outpatient treatment. Client is scheduled to begin non-intensive aftercare group therapy as recommended beginning 4/29/19. Client is recommended to start attending 3 sober support group meetings each week to aid in the recovery process.         Electronically signed by Christiano Hussein RN on 4/23/2019 at 10:14 AM

## 2019-04-24 ENCOUNTER — HOSPITAL ENCOUNTER (OUTPATIENT)
Dept: PSYCHIATRY | Age: 29
Setting detail: THERAPIES SERIES
Discharge: HOME | End: 2019-04-24
Payer: MEDICARE

## 2019-04-24 NOTE — CARE COORDINATION
Client discussed in treatment team today  Present: Dr. Burke Bernheim, Reynolds Czar  Recommendation:Client discussed in treatment team, client will be discharged from [facility] with successful completion and transferred to Aftercare.

## 2019-04-25 ENCOUNTER — APPOINTMENT (OUTPATIENT)
Dept: PSYCHIATRY | Age: 29
End: 2019-04-25
Payer: MEDICARE

## 2019-04-29 ENCOUNTER — APPOINTMENT (OUTPATIENT)
Dept: PSYCHIATRY | Age: 29
End: 2019-04-29
Payer: MEDICARE

## 2019-06-12 ENCOUNTER — HOSPITAL ENCOUNTER (EMERGENCY)
Age: 29
Discharge: HOME | End: 2019-06-12
Payer: MEDICARE

## 2019-06-12 VITALS
SYSTOLIC BLOOD PRESSURE: 111 MMHG | OXYGEN SATURATION: 98 % | HEIGHT: 71 IN | BODY MASS INDEX: 21 KG/M2 | WEIGHT: 150 LBS | DIASTOLIC BLOOD PRESSURE: 63 MMHG | HEART RATE: 93 BPM | RESPIRATION RATE: 16 BRPM | TEMPERATURE: 98 F

## 2019-06-12 DIAGNOSIS — T63.441A BEE STING REACTION, ACCIDENTAL OR UNINTENTIONAL, INITIAL ENCOUNTER: Primary | ICD-10-CM

## 2019-06-12 PROCEDURE — 6370000000 HC RX 637 (ALT 250 FOR IP): Performed by: PHYSICIAN ASSISTANT

## 2019-06-12 PROCEDURE — 99281 EMR DPT VST MAYX REQ PHY/QHP: CPT

## 2019-06-12 RX ORDER — DIPHENHYDRAMINE HCL 25 MG
25 TABLET ORAL ONCE
Status: COMPLETED | OUTPATIENT
Start: 2019-06-12 | End: 2019-06-12

## 2019-06-12 RX ORDER — EPINEPHRINE 0.3 MG/.3ML
0.3 INJECTION SUBCUTANEOUS
Qty: 1 EACH | Refills: 0 | Status: SHIPPED | OUTPATIENT
Start: 2019-06-12 | End: 2020-10-20

## 2019-06-12 RX ORDER — DIPHENHYDRAMINE HCL 25 MG
25 TABLET ORAL EVERY 6 HOURS PRN
Qty: 30 TABLET | Refills: 0 | Status: SHIPPED | OUTPATIENT
Start: 2019-06-12 | End: 2019-07-12

## 2019-06-12 RX ORDER — DIPHENHYDRAMINE HCL 25 MG
25 TABLET ORAL ONCE
Status: DISCONTINUED | OUTPATIENT
Start: 2019-06-12 | End: 2019-06-12 | Stop reason: HOSPADM

## 2019-06-12 RX ORDER — PREDNISONE 10 MG/1
60 TABLET ORAL ONCE
Status: COMPLETED | OUTPATIENT
Start: 2019-06-12 | End: 2019-06-12

## 2019-06-12 RX ORDER — PREDNISONE 10 MG/1
40 TABLET ORAL DAILY
Qty: 20 TABLET | Refills: 0 | Status: SHIPPED | OUTPATIENT
Start: 2019-06-12 | End: 2019-06-17

## 2019-06-12 RX ORDER — FAMOTIDINE 20 MG/1
20 TABLET, FILM COATED ORAL ONCE
Status: COMPLETED | OUTPATIENT
Start: 2019-06-12 | End: 2019-06-12

## 2019-06-12 RX ORDER — FAMOTIDINE 20 MG/1
20 TABLET, FILM COATED ORAL 2 TIMES DAILY
Qty: 14 TABLET | Refills: 0 | Status: SHIPPED | OUTPATIENT
Start: 2019-06-12 | End: 2020-10-20

## 2019-06-12 RX ADMIN — DIPHENHYDRAMINE HCL 25 MG: 25 TABLET ORAL at 17:30

## 2019-06-12 RX ADMIN — FAMOTIDINE 20 MG: 20 TABLET ORAL at 17:30

## 2019-06-12 RX ADMIN — PREDNISONE 60 MG: 10 TABLET ORAL at 17:30

## 2019-06-12 NOTE — ED PROVIDER NOTES
Wt 150 lb (68 kg)   SpO2 98%   BMI 20.92 kg/m²   Oxygen Saturation Interpretation: Normal      ---------------------------------------------------PHYSICAL EXAM--------------------------------------      Constitutional/General: Alert and oriented x3, well appearing, non toxic in NAD  Head: Normocephalic and atraumatic  Eyes: PERRL, EOMI  Mouth: Oropharynx clear, handling secretions, no trismus of the pharynx no tonsillar swelling or exudate uvula is midline no edema noted of the posterior pharynx and tongue. Neck: Supple, full ROM, no stridor. Pulmonary: Lungs clear to auscultation bilaterally, no wheezes, rales, or rhonchi. Not in respiratory distress  Cardiovascular:  Regular rate and rhythm, no murmurs, gallops, or rubs. 2+ distal pulses  Abdomen: Soft, non tender, non distended,   Extremities: Moves all extremities x 4. Warm and well perfused upper arm with 6 cm area of erythema. Skin: warm and dry without rash  Neurologic: GCS 15,  Psych: Normal Affect      ------------------------------ ED COURSE/MEDICAL DECISION MAKING----------------------  Medications   predniSONE (DELTASONE) tablet 60 mg (60 mg Oral Given 6/12/19 1730)   famotidine (PEPCID) tablet 20 mg (20 mg Oral Given 6/12/19 1730)   diphenhydrAMINE (BENADRYL) tablet 25 mg (25 mg Oral Given 6/12/19 1730)         ED COURSE:   1755 patient without any complaints at this time     Medical Decision Making:     Patient  has history of bee sting  allergies, he was stung by a wasp with local reaction only was given prednisone Benadryl Pepcid. He was discharged with the same and given a prescription for EpiPen. Counseling: The emergency provider has spoken with the patient and discussed todays results, in addition to providing specific details for the plan of care and counseling regarding the diagnosis and prognosis.   Questions are answered at this time and they are agreeable with the plan.      --------------------------------- IMPRESSION AND DISPOSITION ---------------------------------    IMPRESSION  1. Bee sting reaction, accidental or unintentional, initial encounter        DISPOSITION  Disposition: Discharge to home  Patient condition is good      NOTE: This report was transcribed using voice recognition software.  Every effort was made to ensure accuracy; however, inadvertent computerized transcription errors may be present     Zora Whiteheadma  06/12/19 5167

## 2019-12-15 ENCOUNTER — HOSPITAL ENCOUNTER (EMERGENCY)
Age: 29
Discharge: HOME | End: 2019-12-15
Attending: FAMILY MEDICINE

## 2019-12-15 VITALS
HEIGHT: 71 IN | TEMPERATURE: 96.8 F | HEART RATE: 79 BPM | DIASTOLIC BLOOD PRESSURE: 79 MMHG | SYSTOLIC BLOOD PRESSURE: 118 MMHG | WEIGHT: 153 LBS | RESPIRATION RATE: 16 BRPM | BODY MASS INDEX: 21.42 KG/M2 | OXYGEN SATURATION: 98 %

## 2019-12-15 DIAGNOSIS — J40 BRONCHITIS: Primary | ICD-10-CM

## 2019-12-15 PROCEDURE — G0381 LEV 2 HOSP TYPE B ED VISIT: HCPCS

## 2019-12-15 RX ORDER — ALBUTEROL SULFATE 90 UG/1
2 AEROSOL, METERED RESPIRATORY (INHALATION) EVERY 4 HOURS PRN
Qty: 1 INHALER | Refills: 0 | Status: SHIPPED | OUTPATIENT
Start: 2019-12-15 | End: 2020-10-20

## 2019-12-15 RX ORDER — PREDNISONE 20 MG/1
40 TABLET ORAL DAILY
Qty: 10 TABLET | Refills: 0 | Status: SHIPPED | OUTPATIENT
Start: 2019-12-15 | End: 2019-12-20

## 2019-12-15 RX ORDER — BENZONATATE 100 MG/1
100 CAPSULE ORAL 3 TIMES DAILY PRN
Qty: 20 CAPSULE | Refills: 0 | Status: SHIPPED | OUTPATIENT
Start: 2019-12-15 | End: 2019-12-22

## 2020-10-20 ENCOUNTER — HOSPITAL ENCOUNTER (EMERGENCY)
Age: 30
Discharge: HOME | End: 2020-10-20
Attending: EMERGENCY MEDICINE

## 2020-10-20 ENCOUNTER — APPOINTMENT (OUTPATIENT)
Dept: GENERAL RADIOLOGY | Age: 30
End: 2020-10-20

## 2020-10-20 VITALS
RESPIRATION RATE: 16 BRPM | HEIGHT: 70 IN | WEIGHT: 160 LBS | DIASTOLIC BLOOD PRESSURE: 88 MMHG | HEART RATE: 104 BPM | OXYGEN SATURATION: 98 % | BODY MASS INDEX: 22.9 KG/M2 | TEMPERATURE: 98.2 F | SYSTOLIC BLOOD PRESSURE: 128 MMHG

## 2020-10-20 PROCEDURE — 73610 X-RAY EXAM OF ANKLE: CPT

## 2020-10-20 PROCEDURE — G0382 LEV 3 HOSP TYPE B ED VISIT: HCPCS

## 2020-10-20 RX ORDER — IBUPROFEN 800 MG/1
800 TABLET ORAL EVERY 6 HOURS PRN
COMMUNITY

## 2020-10-20 RX ORDER — IBUPROFEN 200 MG
400 TABLET ORAL EVERY 6 HOURS PRN
COMMUNITY

## 2020-10-20 ASSESSMENT — PAIN SCALES - GENERAL: PAINLEVEL_OUTOF10: 6

## 2020-10-20 ASSESSMENT — ENCOUNTER SYMPTOMS
EYE REDNESS: 0
WHEEZING: 0
EYE DISCHARGE: 0
CONTUSION: 1
SINUS PRESSURE: 0
SHORTNESS OF BREATH: 0
COUGH: 0
NAUSEA: 0
BACK PAIN: 0
SORE THROAT: 0
EYE PAIN: 0
ABDOMINAL PAIN: 0
VOMITING: 0
DIARRHEA: 0
FACIAL SWELLING: 1

## 2020-10-20 ASSESSMENT — PAIN DESCRIPTION - FREQUENCY: FREQUENCY: CONTINUOUS

## 2020-10-20 ASSESSMENT — PAIN DESCRIPTION - DESCRIPTORS: DESCRIPTORS: ACHING

## 2020-10-20 ASSESSMENT — PAIN DESCRIPTION - PROGRESSION: CLINICAL_PROGRESSION: NOT CHANGED

## 2020-10-20 ASSESSMENT — PAIN DESCRIPTION - ONSET: ONSET: SUDDEN

## 2020-10-20 ASSESSMENT — PAIN DESCRIPTION - ORIENTATION: ORIENTATION: RIGHT

## 2020-10-20 ASSESSMENT — PAIN DESCRIPTION - PAIN TYPE: TYPE: ACUTE PAIN

## 2020-10-20 NOTE — LETTER
[address] [place] [address] Emergency Department  [address] [address]  Phone: [phone]               October 20, 2020    Patient: Tomi Wong   YOB: 1990   Date of Visit: 10/20/2020       To Whom It May Concern:    Levi Hartley was seen and treated in our emergency department on 10/20/2020. He may return to work on 10/22/20.       Sincerely,       Yesi Kennedy RN         Signature:__________________________________
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
## 2022-10-12 ENCOUNTER — APPOINTMENT (OUTPATIENT)
Dept: GENERAL RADIOLOGY | Age: 32
DRG: 536 | End: 2022-10-12
Payer: COMMERCIAL

## 2022-10-12 ENCOUNTER — APPOINTMENT (OUTPATIENT)
Dept: CT IMAGING | Age: 32
DRG: 536 | End: 2022-10-12
Payer: COMMERCIAL

## 2022-10-12 ENCOUNTER — HOSPITAL ENCOUNTER (INPATIENT)
Age: 32
LOS: 7 days | Discharge: HOME HEALTH | DRG: 536 | End: 2022-10-19
Attending: EMERGENCY MEDICINE | Admitting: SURGERY
Payer: COMMERCIAL

## 2022-10-12 DIAGNOSIS — T07.XXXA MULTIPLE LACERATIONS: ICD-10-CM

## 2022-10-12 DIAGNOSIS — S73.004A DISLOCATION OF RIGHT HIP, INITIAL ENCOUNTER (HCC): Primary | ICD-10-CM
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
## 2022-10-12 PROBLEM — F10.920 ACUTE ALCOHOLIC INTOXICATION WITHOUT COMPLICATION (HCC): Status: ACTIVE | Noted: 2022-10-12

## 2022-10-12 PROBLEM — S32.441A: Status: ACTIVE | Noted: 2022-10-12

## 2022-10-12 PROBLEM — S72.001A CLOSED FRACTURE DISLOCATION OF RIGHT HIP JOINT (HCC): Status: ACTIVE | Noted: 2022-10-12
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
## 2022-10-12 LAB
ABO/RH: NORMAL
ACETAMINOPHEN LEVEL: <5 MCG/ML (ref 10–30)
ALBUMIN SERPL-MCNC: 4 G/DL (ref 3.5–5.2)
ALP BLD-CCNC: 90 U/L (ref 40–129)
ALT SERPL-CCNC: 26 U/L (ref 0–40)
AMPHETAMINE SCREEN, URINE: NOT DETECTED
ANGLE (CLOT STRENGTH): 72.2 DEGREE (ref 59–74)
ANION GAP SERPL CALCULATED.3IONS-SCNC: 13 MMOL/L (ref 7–16)
ANTIBODY SCREEN: NORMAL
APTT: 25.4 SEC (ref 24.5–35.1)
AST SERPL-CCNC: 31 U/L (ref 0–39)
B.E.: -3 MMOL/L (ref -3–3)
BARBITURATE SCREEN URINE: NOT DETECTED
BENZODIAZEPINE SCREEN, URINE: NOT DETECTED
BILIRUB SERPL-MCNC: 0.4 MG/DL (ref 0–1.2)
BUN BLDV-MCNC: 7 MG/DL (ref 6–20)
CALCIUM SERPL-MCNC: 8.5 MG/DL (ref 8.6–10.2)
CANNABINOID SCREEN URINE: POSITIVE
CHLORIDE BLD-SCNC: 106 MMOL/L (ref 98–107)
CO2: 21 MMOL/L (ref 22–29)
COCAINE METABOLITE SCREEN URINE: NOT DETECTED
COHB: 7.4 % (ref 0–1.5)
CREAT SERPL-MCNC: 0.8 MG/DL (ref 0.7–1.2)
CRITICAL: ABNORMAL
DATE ANALYZED: ABNORMAL
DATE OF COLLECTION: ABNORMAL
EPL-TEG: 1.7 % (ref 0–15)
ETHANOL: 338 MG/DL (ref 0–0.08)
FENTANYL SCREEN, URINE: POSITIVE
G-TEG: 8.7 K D/SC (ref 4.5–11)
GFR AFRICAN AMERICAN: >60
GFR NON-AFRICAN AMERICAN: >60 ML/MIN/1.73
GLUCOSE BLD-MCNC: 157 MG/DL (ref 74–99)
HCO3: 22.5 MMOL/L (ref 22–26)
HCT VFR BLD CALC: 49.1 % (ref 37–54)
HEMOGLOBIN: 17.1 G/DL (ref 12.5–16.5)
HHB: 0.6 % (ref 0–5)
INR BLD: 1
K (CLOTTING TIME): 1.2 MIN (ref 1–3)
LAB: ABNORMAL
LACTIC ACID: 2.6 MMOL/L (ref 0.5–2.2)
LY30 (FIBRINOLYSIS): 1.7 % (ref 0–8)
Lab: ABNORMAL
Lab: ABNORMAL
MA (MAX AMPLITUDE): 63.6 MM (ref 50–70)
MCH RBC QN AUTO: 31.2 PG (ref 26–35)
MCHC RBC AUTO-ENTMCNC: 34.8 % (ref 32–34.5)
MCV RBC AUTO: 89.6 FL (ref 80–99.9)
METHADONE SCREEN, URINE: NOT DETECTED
METHB: 0.5 % (ref 0–1.5)
MODE: ABNORMAL
O2 CONTENT: 23.6 ML/DL
O2 SATURATION: 99.3 % (ref 92–98.5)
O2HB: 91.5 % (ref 94–97)
OPERATOR ID: 467
OPIATE SCREEN URINE: NOT DETECTED
OXYCODONE URINE: NOT DETECTED
PATIENT TEMP: 37 C
PCO2: 41.8 MMHG (ref 35–45)
PDW BLD-RTO: 11.7 FL (ref 11.5–15)
PH BLOOD GAS: 7.35 (ref 7.35–7.45)
PHENCYCLIDINE SCREEN URINE: NOT DETECTED
PLATELET # BLD: 216 E9/L (ref 130–450)
PMV BLD AUTO: 9.3 FL (ref 7–12)
PO2: 454.4 MMHG (ref 75–100)
POTASSIUM SERPL-SCNC: 3.82 MMOL/L (ref 3.5–5)
POTASSIUM SERPL-SCNC: 4 MMOL/L (ref 3.5–5)
PROTHROMBIN TIME: 11 SEC (ref 9.3–12.4)
R (REACTION TIME): 3.7 MIN (ref 5–10)
RBC # BLD: 5.48 E12/L (ref 3.8–5.8)
SALICYLATE, SERUM: <0.3 MG/DL (ref 0–30)
SODIUM BLD-SCNC: 140 MMOL/L (ref 132–146)
SOURCE, BLOOD GAS: ABNORMAL
THB: 17.4 G/DL (ref 11.5–16.5)
TIME ANALYZED: 1959
TOTAL PROTEIN: 6.5 G/DL (ref 6.4–8.3)
TRICYCLIC ANTIDEPRESSANTS SCREEN SERUM: NEGATIVE NG/ML
WBC # BLD: 10.1 E9/L (ref 4.5–11.5)

## 2022-10-12 PROCEDURE — 73070 X-RAY EXAM OF ELBOW: CPT

## 2022-10-12 PROCEDURE — 99223 1ST HOSP IP/OBS HIGH 75: CPT | Performed by: SURGERY

## 2022-10-12 PROCEDURE — 70450 CT HEAD/BRAIN W/O DYE: CPT

## 2022-10-12 PROCEDURE — 73552 X-RAY EXAM OF FEMUR 2/>: CPT

## 2022-10-12 PROCEDURE — 80053 COMPREHEN METABOLIC PANEL: CPT

## 2022-10-12 PROCEDURE — 86901 BLOOD TYPING SEROLOGIC RH(D): CPT

## 2022-10-12 PROCEDURE — 71260 CT THORAX DX C+: CPT

## 2022-10-12 PROCEDURE — 27250 TREAT HIP DISLOCATION: CPT

## 2022-10-12 PROCEDURE — 85347 COAGULATION TIME ACTIVATED: CPT

## 2022-10-12 PROCEDURE — 94150 VITAL CAPACITY TEST: CPT

## 2022-10-12 PROCEDURE — 80307 DRUG TEST PRSMV CHEM ANLYZR: CPT

## 2022-10-12 PROCEDURE — 85576 BLOOD PLATELET AGGREGATION: CPT

## 2022-10-12 PROCEDURE — 72125 CT NECK SPINE W/O DYE: CPT

## 2022-10-12 PROCEDURE — 86900 BLOOD TYPING SEROLOGIC ABO: CPT

## 2022-10-12 PROCEDURE — 85384 FIBRINOGEN ACTIVITY: CPT

## 2022-10-12 PROCEDURE — 84132 ASSAY OF SERUM POTASSIUM: CPT

## 2022-10-12 PROCEDURE — 72170 X-RAY EXAM OF PELVIS: CPT

## 2022-10-12 PROCEDURE — 80143 DRUG ASSAY ACETAMINOPHEN: CPT

## 2022-10-12 PROCEDURE — 82805 BLOOD GASES W/O2 SATURATION: CPT

## 2022-10-12 PROCEDURE — 82077 ASSAY SPEC XCP UR&BREATH IA: CPT

## 2022-10-12 PROCEDURE — 6810039000 HC L1 TRAUMA ALERT

## 2022-10-12 PROCEDURE — 6370000000 HC RX 637 (ALT 250 FOR IP): Performed by: SURGERY

## 2022-10-12 PROCEDURE — 0HQEXZZ REPAIR LEFT LOWER ARM SKIN, EXTERNAL APPROACH: ICD-10-PCS | Performed by: EMERGENCY MEDICINE

## 2022-10-12 PROCEDURE — 0QS4XZZ REPOSITION RIGHT ACETABULUM, EXTERNAL APPROACH: ICD-10-PCS | Performed by: EMERGENCY MEDICINE

## 2022-10-12 PROCEDURE — 0HQ0XZZ REPAIR SCALP SKIN, EXTERNAL APPROACH: ICD-10-PCS | Performed by: EMERGENCY MEDICINE

## 2022-10-12 PROCEDURE — 6360000004 HC RX CONTRAST MEDICATION: Performed by: RADIOLOGY

## 2022-10-12 PROCEDURE — 6370000000 HC RX 637 (ALT 250 FOR IP): Performed by: STUDENT IN AN ORGANIZED HEALTH CARE EDUCATION/TRAINING PROGRAM

## 2022-10-12 PROCEDURE — 86850 RBC ANTIBODY SCREEN: CPT

## 2022-10-12 PROCEDURE — 99285 EMERGENCY DEPT VISIT HI MDM: CPT

## 2022-10-12 PROCEDURE — 71045 X-RAY EXAM CHEST 1 VIEW: CPT

## 2022-10-12 PROCEDURE — 2500000003 HC RX 250 WO HCPCS: Performed by: SURGERY

## 2022-10-12 PROCEDURE — 80179 DRUG ASSAY SALICYLATE: CPT

## 2022-10-12 PROCEDURE — 83605 ASSAY OF LACTIC ACID: CPT

## 2022-10-12 PROCEDURE — 70486 CT MAXILLOFACIAL W/O DYE: CPT

## 2022-10-12 PROCEDURE — 85730 THROMBOPLASTIN TIME PARTIAL: CPT

## 2022-10-12 PROCEDURE — 85610 PROTHROMBIN TIME: CPT

## 2022-10-12 PROCEDURE — 74177 CT ABD & PELVIS W/CONTRAST: CPT

## 2022-10-12 PROCEDURE — 1200000000 HC SEMI PRIVATE

## 2022-10-12 PROCEDURE — 85027 COMPLETE CBC AUTOMATED: CPT

## 2022-10-12 PROCEDURE — 36415 COLL VENOUS BLD VENIPUNCTURE: CPT

## 2022-10-12 RX ORDER — POLYETHYLENE GLYCOL 3350 17 G/17G
17 POWDER, FOR SOLUTION ORAL DAILY
Status: DISCONTINUED | OUTPATIENT
Start: 2022-10-13 | End: 2022-10-19 | Stop reason: HOSPADM

## 2022-10-12 RX ORDER — SODIUM CHLORIDE 0.9 % (FLUSH) 0.9 %
10 SYRINGE (ML) INJECTION PRN
Status: DISCONTINUED | OUTPATIENT
Start: 2022-10-12 | End: 2022-10-19 | Stop reason: HOSPADM

## 2022-10-12 RX ORDER — SODIUM CHLORIDE 9 MG/ML
INJECTION, SOLUTION INTRAVENOUS PRN
Status: DISCONTINUED | OUTPATIENT
Start: 2022-10-12 | End: 2022-10-19 | Stop reason: HOSPADM

## 2022-10-12 RX ORDER — METHOCARBAMOL 500 MG/1
1000 TABLET, FILM COATED ORAL ONCE
Status: COMPLETED | OUTPATIENT
Start: 2022-10-12 | End: 2022-10-12

## 2022-10-12 RX ORDER — ONDANSETRON 4 MG/1
4 TABLET, ORALLY DISINTEGRATING ORAL EVERY 8 HOURS PRN
Status: DISCONTINUED | OUTPATIENT
Start: 2022-10-12 | End: 2022-10-19 | Stop reason: HOSPADM

## 2022-10-12 RX ORDER — LIDOCAINE HYDROCHLORIDE AND EPINEPHRINE 10; 10 MG/ML; UG/ML
20 INJECTION, SOLUTION INFILTRATION; PERINEURAL ONCE
Status: COMPLETED | OUTPATIENT
Start: 2022-10-12 | End: 2022-10-12

## 2022-10-12 RX ORDER — ACETAMINOPHEN 325 MG/1
650 TABLET ORAL EVERY 6 HOURS
Status: DISCONTINUED | OUTPATIENT
Start: 2022-10-13 | End: 2022-10-19 | Stop reason: HOSPADM

## 2022-10-12 RX ORDER — SODIUM CHLORIDE, SODIUM LACTATE, POTASSIUM CHLORIDE, CALCIUM CHLORIDE 600; 310; 30; 20 MG/100ML; MG/100ML; MG/100ML; MG/100ML
INJECTION, SOLUTION INTRAVENOUS CONTINUOUS
Status: DISCONTINUED | OUTPATIENT
Start: 2022-10-13 | End: 2022-10-13

## 2022-10-12 RX ORDER — SODIUM CHLORIDE 0.9 % (FLUSH) 0.9 %
10 SYRINGE (ML) INJECTION EVERY 12 HOURS SCHEDULED
Status: DISCONTINUED | OUTPATIENT
Start: 2022-10-13 | End: 2022-10-19 | Stop reason: HOSPADM

## 2022-10-12 RX ORDER — DIAPER,BRIEF,INFANT-TODD,DISP
EACH MISCELLANEOUS 3 TIMES DAILY
Status: DISCONTINUED | OUTPATIENT
Start: 2022-10-12 | End: 2022-10-19 | Stop reason: HOSPADM

## 2022-10-12 RX ORDER — ONDANSETRON 2 MG/ML
4 INJECTION INTRAMUSCULAR; INTRAVENOUS EVERY 6 HOURS PRN
Status: DISCONTINUED | OUTPATIENT
Start: 2022-10-12 | End: 2022-10-19 | Stop reason: HOSPADM

## 2022-10-12 RX ORDER — OXYCODONE HYDROCHLORIDE 10 MG/1
10 TABLET ORAL EVERY 4 HOURS PRN
Status: DISCONTINUED | OUTPATIENT
Start: 2022-10-12 | End: 2022-10-19 | Stop reason: HOSPADM

## 2022-10-12 RX ORDER — TETANUS AND DIPHTHERIA TOXOIDS ADSORBED 2; 2 [LF]/.5ML; [LF]/.5ML
INJECTION INTRAMUSCULAR
Status: DISPENSED
Start: 2022-10-12 | End: 2022-10-13

## 2022-10-12 RX ORDER — BACITRACIN ZINC AND POLYMYXIN B SULFATE 500; 10000 [USP'U]/G; [USP'U]/G
OINTMENT OPHTHALMIC EVERY 12 HOURS SCHEDULED
Status: DISCONTINUED | OUTPATIENT
Start: 2022-10-12 | End: 2022-10-19 | Stop reason: HOSPADM

## 2022-10-12 RX ORDER — METHOCARBAMOL 500 MG/1
1000 TABLET, FILM COATED ORAL 4 TIMES DAILY
Status: DISCONTINUED | OUTPATIENT
Start: 2022-10-13 | End: 2022-10-19 | Stop reason: HOSPADM

## 2022-10-12 RX ORDER — OXYCODONE HYDROCHLORIDE 5 MG/1
5 TABLET ORAL EVERY 4 HOURS PRN
Status: DISCONTINUED | OUTPATIENT
Start: 2022-10-12 | End: 2022-10-19 | Stop reason: HOSPADM

## 2022-10-12 RX ADMIN — BACITRACIN ZINC AND POLYMYXIN B SULFATE: 500; 10000 OINTMENT OPHTHALMIC at 22:51

## 2022-10-12 RX ADMIN — METHOCARBAMOL 1000 MG: 500 TABLET ORAL at 22:00

## 2022-10-12 RX ADMIN — IOPAMIDOL 75 ML: 755 INJECTION, SOLUTION INTRAVENOUS at 20:34

## 2022-10-12 RX ADMIN — LIDOCAINE HYDROCHLORIDE,EPINEPHRINE BITARTRATE 20 ML: 10; .01 INJECTION, SOLUTION INFILTRATION; PERINEURAL at 22:11

## 2022-10-12 RX ADMIN — BACITRACIN ZINC: 500 OINTMENT TOPICAL at 22:12

## 2022-10-12 ASSESSMENT — PAIN DESCRIPTION - ORIENTATION: ORIENTATION: RIGHT

## 2022-10-12 ASSESSMENT — PAIN SCALES - GENERAL: PAINLEVEL_OUTOF10: 7

## 2022-10-12 ASSESSMENT — PAIN DESCRIPTION - DESCRIPTORS: DESCRIPTORS: ACHING

## 2022-10-12 ASSESSMENT — PAIN DESCRIPTION - LOCATION: LOCATION: HIP
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
## 2022-10-12 NOTE — ED NOTES
Head and face non tender, bilateral shoulders non tender no deformities  Small laceration to left dorsal knee  Large 4cm laceration the left elbow  superficial abrasion to right hand   Right leg internally rotated  Patient has c/o of right hip  No deformity's of right lower extermity  Small abrasion to left lower extremity        Hardeep Alcantara RN  10/12/22 1956

## 2022-10-12 NOTE — ED NOTES
No allergies to medications per patient  tdap greater than 5 years ago     Manju Serra RN  10/12/22 1956
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
## 2022-10-13 ENCOUNTER — APPOINTMENT (OUTPATIENT)
Dept: GENERAL RADIOLOGY | Age: 32
DRG: 536 | End: 2022-10-13
Payer: COMMERCIAL

## 2022-10-13 LAB
ANION GAP SERPL CALCULATED.3IONS-SCNC: 14 MMOL/L (ref 7–16)
BASOPHILS ABSOLUTE: 0.03 E9/L (ref 0–0.2)
BASOPHILS RELATIVE PERCENT: 0.3 % (ref 0–2)
BUN BLDV-MCNC: 8 MG/DL (ref 6–20)
CALCIUM SERPL-MCNC: 8.3 MG/DL (ref 8.6–10.2)
CHLORIDE BLD-SCNC: 105 MMOL/L (ref 98–107)
CO2: 21 MMOL/L (ref 22–29)
CREAT SERPL-MCNC: 0.8 MG/DL (ref 0.7–1.2)
EOSINOPHILS ABSOLUTE: 0.05 E9/L (ref 0.05–0.5)
EOSINOPHILS RELATIVE PERCENT: 0.4 % (ref 0–6)
GFR AFRICAN AMERICAN: >60
GFR NON-AFRICAN AMERICAN: >60 ML/MIN/1.73
GLUCOSE BLD-MCNC: 91 MG/DL (ref 74–99)
HCT VFR BLD CALC: 45.9 % (ref 37–54)
HEMOGLOBIN: 15.6 G/DL (ref 12.5–16.5)
IMMATURE GRANULOCYTES #: 0.03 E9/L
IMMATURE GRANULOCYTES %: 0.3 % (ref 0–5)
LACTIC ACID: 2.9 MMOL/L (ref 0.5–2.2)
LYMPHOCYTES ABSOLUTE: 2.23 E9/L (ref 1.5–4)
LYMPHOCYTES RELATIVE PERCENT: 18.9 % (ref 20–42)
MCH RBC QN AUTO: 30.8 PG (ref 26–35)
MCHC RBC AUTO-ENTMCNC: 34 % (ref 32–34.5)
MCV RBC AUTO: 90.7 FL (ref 80–99.9)
MONOCYTES ABSOLUTE: 0.63 E9/L (ref 0.1–0.95)
MONOCYTES RELATIVE PERCENT: 5.3 % (ref 2–12)
NEUTROPHILS ABSOLUTE: 8.86 E9/L (ref 1.8–7.3)
NEUTROPHILS RELATIVE PERCENT: 74.8 % (ref 43–80)
PDW BLD-RTO: 11.9 FL (ref 11.5–15)
PLATELET # BLD: 172 E9/L (ref 130–450)
PMV BLD AUTO: 9.3 FL (ref 7–12)
POTASSIUM REFLEX MAGNESIUM: 4 MMOL/L (ref 3.5–5)
RBC # BLD: 5.06 E12/L (ref 3.8–5.8)
SODIUM BLD-SCNC: 140 MMOL/L (ref 132–146)
WBC # BLD: 11.8 E9/L (ref 4.5–11.5)

## 2022-10-13 PROCEDURE — 97530 THERAPEUTIC ACTIVITIES: CPT

## 2022-10-13 PROCEDURE — 6370000000 HC RX 637 (ALT 250 FOR IP): Performed by: STUDENT IN AN ORGANIZED HEALTH CARE EDUCATION/TRAINING PROGRAM

## 2022-10-13 PROCEDURE — 6370000000 HC RX 637 (ALT 250 FOR IP)

## 2022-10-13 PROCEDURE — 99221 1ST HOSP IP/OBS SF/LOW 40: CPT | Performed by: ORTHOPAEDIC SURGERY

## 2022-10-13 PROCEDURE — 2580000003 HC RX 258: Performed by: STUDENT IN AN ORGANIZED HEALTH CARE EDUCATION/TRAINING PROGRAM

## 2022-10-13 PROCEDURE — 6360000002 HC RX W HCPCS: Performed by: SURGERY

## 2022-10-13 PROCEDURE — 6360000002 HC RX W HCPCS: Performed by: STUDENT IN AN ORGANIZED HEALTH CARE EDUCATION/TRAINING PROGRAM

## 2022-10-13 PROCEDURE — 80048 BASIC METABOLIC PNL TOTAL CA: CPT

## 2022-10-13 PROCEDURE — 97161 PT EVAL LOW COMPLEX 20 MIN: CPT

## 2022-10-13 PROCEDURE — 85025 COMPLETE CBC W/AUTO DIFF WBC: CPT

## 2022-10-13 PROCEDURE — 83605 ASSAY OF LACTIC ACID: CPT

## 2022-10-13 PROCEDURE — 99232 SBSQ HOSP IP/OBS MODERATE 35: CPT | Performed by: SURGERY

## 2022-10-13 PROCEDURE — 27220 TREAT HIP SOCKET FRACTURE: CPT | Performed by: ORTHOPAEDIC SURGERY

## 2022-10-13 PROCEDURE — 1200000000 HC SEMI PRIVATE

## 2022-10-13 PROCEDURE — 97165 OT EVAL LOW COMPLEX 30 MIN: CPT

## 2022-10-13 PROCEDURE — 72190 X-RAY EXAM OF PELVIS: CPT

## 2022-10-13 PROCEDURE — 36415 COLL VENOUS BLD VENIPUNCTURE: CPT

## 2022-10-13 RX ORDER — ENOXAPARIN SODIUM 100 MG/ML
30 INJECTION SUBCUTANEOUS 2 TIMES DAILY
Status: DISCONTINUED | OUTPATIENT
Start: 2022-10-13 | End: 2022-10-19 | Stop reason: HOSPADM

## 2022-10-13 RX ORDER — NICOTINE 21 MG/24HR
1 PATCH, TRANSDERMAL 24 HOURS TRANSDERMAL DAILY
Status: DISCONTINUED | OUTPATIENT
Start: 2022-10-13 | End: 2022-10-19 | Stop reason: HOSPADM

## 2022-10-13 RX ADMIN — HYDROMORPHONE HYDROCHLORIDE 0.5 MG: 1 INJECTION, SOLUTION INTRAMUSCULAR; INTRAVENOUS; SUBCUTANEOUS at 15:50

## 2022-10-13 RX ADMIN — OXYCODONE HYDROCHLORIDE 10 MG: 10 TABLET ORAL at 04:49

## 2022-10-13 RX ADMIN — METHOCARBAMOL TABLETS 1000 MG: 500 TABLET, COATED ORAL at 18:09

## 2022-10-13 RX ADMIN — BISACODYL 5 MG: 5 TABLET, DELAYED RELEASE ORAL at 10:08

## 2022-10-13 RX ADMIN — SODIUM CHLORIDE, PRESERVATIVE FREE 10 ML: 5 INJECTION INTRAVENOUS at 15:50

## 2022-10-13 RX ADMIN — SODIUM CHLORIDE, PRESERVATIVE FREE 10 ML: 5 INJECTION INTRAVENOUS at 11:50

## 2022-10-13 RX ADMIN — HYDROMORPHONE HYDROCHLORIDE 0.5 MG: 1 INJECTION, SOLUTION INTRAMUSCULAR; INTRAVENOUS; SUBCUTANEOUS at 03:25

## 2022-10-13 RX ADMIN — METHOCARBAMOL TABLETS 1000 MG: 500 TABLET, COATED ORAL at 13:32

## 2022-10-13 RX ADMIN — ACETAMINOPHEN 650 MG: 325 TABLET, FILM COATED ORAL at 01:01

## 2022-10-13 RX ADMIN — ACETAMINOPHEN 650 MG: 325 TABLET, FILM COATED ORAL at 18:08

## 2022-10-13 RX ADMIN — HYDROMORPHONE HYDROCHLORIDE 0.5 MG: 1 INJECTION, SOLUTION INTRAMUSCULAR; INTRAVENOUS; SUBCUTANEOUS at 06:36

## 2022-10-13 RX ADMIN — SODIUM CHLORIDE, POTASSIUM CHLORIDE, SODIUM LACTATE AND CALCIUM CHLORIDE: 600; 310; 30; 20 INJECTION, SOLUTION INTRAVENOUS at 11:52

## 2022-10-13 RX ADMIN — ACETAMINOPHEN 650 MG: 325 TABLET, FILM COATED ORAL at 13:33

## 2022-10-13 RX ADMIN — HYDROMORPHONE HYDROCHLORIDE 0.5 MG: 1 INJECTION, SOLUTION INTRAMUSCULAR; INTRAVENOUS; SUBCUTANEOUS at 20:37

## 2022-10-13 RX ADMIN — SODIUM CHLORIDE, PRESERVATIVE FREE 10 ML: 5 INJECTION INTRAVENOUS at 10:07

## 2022-10-13 RX ADMIN — HYDROMORPHONE HYDROCHLORIDE 0.5 MG: 1 INJECTION, SOLUTION INTRAMUSCULAR; INTRAVENOUS; SUBCUTANEOUS at 11:51

## 2022-10-13 RX ADMIN — ENOXAPARIN SODIUM 30 MG: 100 INJECTION SUBCUTANEOUS at 20:37

## 2022-10-13 RX ADMIN — OXYCODONE HYDROCHLORIDE 10 MG: 10 TABLET ORAL at 18:13

## 2022-10-13 RX ADMIN — SODIUM CHLORIDE, PRESERVATIVE FREE 10 ML: 5 INJECTION INTRAVENOUS at 20:37

## 2022-10-13 RX ADMIN — BACITRACIN ZINC: 500 OINTMENT TOPICAL at 14:00

## 2022-10-13 RX ADMIN — METHOCARBAMOL TABLETS 1000 MG: 500 TABLET, COATED ORAL at 01:01

## 2022-10-13 RX ADMIN — ACETAMINOPHEN 650 MG: 325 TABLET, FILM COATED ORAL at 06:36

## 2022-10-13 RX ADMIN — SODIUM CHLORIDE, POTASSIUM CHLORIDE, SODIUM LACTATE AND CALCIUM CHLORIDE: 600; 310; 30; 20 INJECTION, SOLUTION INTRAVENOUS at 01:04

## 2022-10-13 RX ADMIN — BACITRACIN ZINC: 500 OINTMENT TOPICAL at 10:09

## 2022-10-13 RX ADMIN — OXYCODONE HYDROCHLORIDE 10 MG: 10 TABLET ORAL at 09:53

## 2022-10-13 RX ADMIN — OXYCODONE HYDROCHLORIDE 10 MG: 10 TABLET ORAL at 13:32

## 2022-10-13 RX ADMIN — METHOCARBAMOL TABLETS 1000 MG: 500 TABLET, COATED ORAL at 09:53

## 2022-10-13 RX ADMIN — METHOCARBAMOL TABLETS 1000 MG: 500 TABLET, COATED ORAL at 20:37

## 2022-10-13 ASSESSMENT — PAIN DESCRIPTION - DESCRIPTORS
DESCRIPTORS: ACHING;SPASM;SHARP
DESCRIPTORS: ACHING;DISCOMFORT;CRUSHING
DESCRIPTORS: ACHING
DESCRIPTORS: ACHING;SORE
DESCRIPTORS: CRAMPING;CRUSHING;ACHING
DESCRIPTORS: SHARP;STABBING
DESCRIPTORS: ACHING;STABBING;SHARP

## 2022-10-13 ASSESSMENT — PAIN - FUNCTIONAL ASSESSMENT
PAIN_FUNCTIONAL_ASSESSMENT: PREVENTS OR INTERFERES SOME ACTIVE ACTIVITIES AND ADLS

## 2022-10-13 ASSESSMENT — PAIN DESCRIPTION - ORIENTATION
ORIENTATION: RIGHT
ORIENTATION: RIGHT
ORIENTATION: RIGHT;LEFT
ORIENTATION: RIGHT

## 2022-10-13 ASSESSMENT — PAIN SCALES - GENERAL
PAINLEVEL_OUTOF10: 6
PAINLEVEL_OUTOF10: 8
PAINLEVEL_OUTOF10: 9
PAINLEVEL_OUTOF10: 8
PAINLEVEL_OUTOF10: 8
PAINLEVEL_OUTOF10: 7
PAINLEVEL_OUTOF10: 7
PAINLEVEL_OUTOF10: 6

## 2022-10-13 ASSESSMENT — PAIN DESCRIPTION - FREQUENCY
FREQUENCY: INTERMITTENT
FREQUENCY: CONTINUOUS

## 2022-10-13 ASSESSMENT — PAIN DESCRIPTION - ONSET
ONSET: GRADUAL
ONSET: GRADUAL
ONSET: ON-GOING

## 2022-10-13 ASSESSMENT — PAIN DESCRIPTION - PAIN TYPE
TYPE: ACUTE PAIN

## 2022-10-13 ASSESSMENT — PAIN DESCRIPTION - LOCATION
LOCATION: LEG
LOCATION: HEAD
LOCATION: HIP;LEG
LOCATION: LEG
LOCATION: RIB CAGE;HIP
LOCATION: HIP;RIB CAGE
LOCATION: HEAD

## 2022-10-13 NOTE — ED NOTES
Patient taken to Ct with surgery and nurse at bedside        Ana Mullins, [facility]  10/12/22 2006

## 2022-10-13 NOTE — ED NOTES
Verbal report given to floor, SBAR faxed and patient placed in transport.      Marlene Davidson RN  10/13/22 1065
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
## 2022-10-13 NOTE — DISCHARGE SUMMARY
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 physician  Ice to operative/injured site for 15-30 minutes of each hour for next 5 days    Recommend that you continue to ice the area 2-3 times per day after this   Elevate operative/injured limb on 2 pillows at home  Goal is to have limb above the heart if able  Continue DVT Prophylaxis (blood clot prevention) as Prescribed: Per Medicine Team   Fracture Care -  maintain knee immobilizer  Follow Up in Office in 1 weeks. Call the office at [phone] for directions or with any questions. Watch for these significant complications. Call physician if they or any other problems occur:  Fever over 101°, redness, swelling or warmth at the operative site  Unrelieved nausea    Foul smelling or cloudy drainage at the operative site   Unrelieved pain    Blood soaked dressing.  (Some oozing may be normal)     Numb, pale, blue, cold or tingling extremity    Future Appointments   Date Time Provider Mc Talley   10/21/2022  8:30 AM SCHEDULE, SE ORTHO RES SE Ortho [facility]       Directions to Orthopaedic Trauma Office at [facility]:   [address] [facility], [facility], [address] through the ED parking lot off [address]  At far side of the parking lot is [address] (large blue awning)-- Enter here  Our office is straight ahead through this entrance and check in will be on your left        It is the Department of Orthopaedic Trauma's standard of practice that providers will de-escalate(wean) all patients from narcotic(opioid) medications during the post-operative period. We provide multimodal pain control but opioid medications are tapered in all of our patients. If patient requires referral to pain management for prolonged taper off of opioid pain medication we will facilitate this process.          Follow up:   [address]  [address]  [phone]  Follow up in 1 week(s)  For suture removal, for follow up    [address]  [facility] [address] [facility], [phone]    Schedule an appointment as soon as possible for a visit in 2 week(s)  right hip dislocation, acetabular fracture       Signed:  Xavier Sawyer MD  10/19/2022  2:54 PM
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
## 2022-10-13 NOTE — PLAN OF CARE
Problem: Discharge Planning  Goal: Discharge to home or other facility with appropriate resources  10/13/2022 0614 by Kaiser Corral RN  Outcome: Progressing     Problem: Skin/Tissue Integrity  Goal: Absence of new skin breakdown  Description: 1. Monitor for areas of redness and/or skin breakdown  2. Assess vascular access sites hourly  3. Every 4-6 hours minimum:  Change oxygen saturation probe site  4. Every 4-6 hours:  If on nasal continuous positive airway pressure, respiratory therapy assess nares and determine need for appliance change or resting period.   10/13/2022 0614 by Kaiser Corral RN  Outcome: Progressing     Problem: ABCDS Injury Assessment  Goal: Absence of physical injury  10/13/2022 1839 by Yee Kilpatrick RN  Outcome: Progressing  10/13/2022 0614 by Kaiser Corral RN  Outcome: Progressing     Problem: Pain  Goal: Verbalizes/displays adequate comfort level or baseline comfort level  Outcome: Progressing

## 2022-10-13 NOTE — DISCHARGE INSTRUCTIONS
TRAUMA SERVICES DISCHARGE INSTRUCTIONS    Call [phone], option 2, for any questions/concerns and for follow-up appointment in 1 week(s). Please follow the instructions checked below:      Please follow-up with your primary care provider. ACTIVITY INSTRUCTIONS  Increase activity as tolerated  No heavy lifting or strenuous activity  Take your incentive spirometer home and use 4-6 times/day   [x]  No driving until cleared by providers    WOUND/DRESSING INSTRUCTIONS:  You may shower. No sitting in bath tub, hot tub or swimming until cleared by physician. Ice to areas of pain for first 24 hours. Heat to areas of pain after that. Wash areas of lacerations/abrasions with soap & water. Rinse well. Pat dry with clean towel. Apply thin layer of Bacitracin, Neosporin, or triple antibiotic cream to affected area 2-3 times per day. Keep wounds clean and dry. [x]  Sutures/Staples are to be removed in 1  week(s). MEDICATION INSTRUCTIONS  Take medication as prescribed. When taking pain medications, you may experience dizziness or drowsiness. Do not drink alcohol or drive when taking these medications. You may experience constipation while taking pain medication. You may take over the counter stool softeners such as docusate (Colace), sennosides S (Senokot-S), or Miralax. [x]  You may take Ibuprofen (over the counter) as directed for mild pain. --You may take up to 800mg every 8 hours for pain, please take with food or milk. [x]  You may take acetaminophen (Tylenol) products. Do NOT take more than 4000mg of Tylenol in 24h. [x]  Do not take any other acetaminophen (Tylenol) products if you are taking Percocet or Norco, as these contain Tylenol. --Do NOT take more than 4000mg of Tylenol in 24h. OPIOID MEDICATION INSTRUCTIONS  Read the medication guide that is included with your prescription. Take your medication exactly as prescribed.   Store medication away from children and in a safe place. Do NOT share your medication with others. Do NOT take medication unless it is prescribed for you. Do NOT drink alcohol while taking opioids (I.e., Norco, Percocet, Oxycodone, etc). Discuss with the Trauma Clinic staff if the dose of medication you are taking does not control your pain and any side effects that you may be having. CALL 911 OR YOUR LOCAL EMERGENCY SERVICE:  --If you take too much medication  --If you have trouble breathing or shortness of breath  --A child has taken this medication. WORK:  You may not return to work until you receive follow-up with the Trauma Clinic or clearance by all consultants. Call the trauma clinic for any of the following or for questions/concerns;  --fever over 101F  --redness, swelling, hardness or warmth at the wound site(s). --Unrelieved nausea/vomiting  --Foul smelling or cloudy drainage at the wound site(s)  --Unrelieved pain or increase in pain  --Increase in shortness of breath    Follow-up:  Trauma Clinic: [phone] [place] [address]    MILD TRAUMATIC BRAIN INJURY OR CONCUSSION  A mild traumatic brain injury (TBI) is one that causes some degree of injury to the brain causing symptoms ranging from a brief period of confusion to loss of consciousness (being knocked out). There is no major bruising or bleeding in the brain but symptoms can last from hours to months depending on the severity of the injury. Family or friends need to observe any change in behavior for the next 48 hours. Delayed effects from head injury do occur occasionally and can be due to slow bleeding or swelling around the surface of the brain. These effects may occur even if the x-rays/CT scans were normal.  Please observe the following symptoms during the next 24-48 hours. CALL 911 if:  Pupils (black part in the center of the eye) are unequal in size, and this is new.   Seizure (convulsion). Not responding to others/won't wake up or very hard to wake up  Faints (passes out)  Vomiting more than 3 times    Notify the TRAUMA CLINIC if any of the following symptoms occur:  Severe headache -- Mild headache may last for days. Report worsening pain or uncontrolled pain with prescribed medicine. Numbness, tingling or weakness -- Present in arms or legs; unsteady walking. Eye Changes/light sensation -- Vision problems; blurred or double-vision; unequal sized pupils. Nausea/Vomiting -- Episodes of vomiting may occur initially after a head injury. Persistent vomiting or difficulty taking medication by mouth. Increased Sleepiness -- Difficulty waking from sleep with increased confusion. Dizziness -- Does not go away or occurs repeatedly. Vomiting may accompany dizziness. Drainage -- Clear fluid or blood from the nose and ears. Fever -- Temperature over 101 degrees. Neck Pain. The First Four Weeks  The symptoms below are common after a mild brain injury. They usually get better on their own within a few weeks:   feeling tired or ?low  problems falling or staying asleep  feeling confused, poor concentration, or slow to answer questions  feeling dizzy, poor balance, or poor coordination  being sensitive to light  being sensitive to sounds  ringing in the ears  a mild headache, sometimes with nausea and/or vomiting  being irritable, having mood swings, or feeling somewhat sad or down  Contact the [address] [place] [address] Road if your symptoms are affecting your everyday activities. Remember that letting yourself get too tired can make your symptoms worse. Listen to your body: if doing a certain activity increases your symptoms, take a break from that activity. Build up the amount of time you do an activity and stay under the threshold of symptoms. Long term Effects (Post-Concussive Syndrome)    Notify physician if any of the following persists longer than 2-4 weeks.     Difficulty with concentration or attention (easily distracted)  Frequent headaches  Memory problems   Sensitivity to light   Sleeping difficulties      There is a higher risk of having a more serious head injury if:  Previous history of head injury or concussion  Taking medicine that thins your blood, or have a bleeding disorder  Have other neurologic (brain) problems  Have difficulty walking or frequent falls  Active in high impact contact sports, like soccer or football. Activities  Stay away from activities that could cause another head injury (like sports), until the doctor says it's okay. A second blow to the head can cause more damage to the brain  Limit reading, television, video games, etc. the first 48 hours. Your brain needs to rest so that it can recover. You may find that it helps to take time off school or work. Limit exposure to bright lights, loud noises, and crowds for the first 48 hours, as these can make your symptoms worse  Limit use of screens, such as an IPad, computer, cellphone, TV, etc, as these can make symptoms, especially headaches, worse. Work/School  It is recommended that you wait to return to work/school until after your follow-up appointment with trauma or your family doctor. If you are having no symptoms, please call for an earlier appointment  Some people find it hard to concentrate well so return to your normal activities slowly. Go back to work or school for half days at first, and increase as tolerated. Trauma services can help you with a graduated schedule. If you feel comfortable doing so, tell work or school about your concussion. You may have to adjust your activities, depending on your job or school demands. Rest and Sleep  Rest for the first 24 hours; it's one of the best things to help your brain recover. It's okay to sleep if you want  You don't have to be woken up every few hours. If someone does wake you up, you should awaken easily.     Do some light physical activity (housework) or light exercise (walking, stationary bike) as soon as you can tolerate the movement. Strenuous exercise (such as jogging or weight lifting) can make your concussion symptoms worse or last longer. Diet  Return to a normal diet as tolerated, you may want to start with liquids first  Eat healthy meals (including breakfast) and snacks throughout the day as your brain heals. Managing Pain  Tylenol or Ibuprofen are the best meds to take for headaches. Your doctor may have prescribed you medications if your headaches were severe or you have other injuries. Please take as directed. Driving  Your ability to concentrate and react quickly might be affected by the concussion. Please contact trauma services for advice on when to resume driving. Do not drive if you're concerned about vision problems, slowed thinking, slowed reaction time, reduced attention or poor judgment. Wear sunglasses even during winter if kiara while driving. The bright light may induce a headache. Alcohol use/Drug use  Don't drink alcohol or use recreational drugs as they may make you feel worse and/or hide the warning signs.         Follow-up:  Trauma Clinic: [phone] -- press option [address]  [place], [address] [place]        Department of Orthopaedic Trauma  [address] Berlinda Mark, DO Dr. Harless Hight, MD Dr. Horacio Bamberger, MD Candace Dory, PA-C Lafrances Lion PA-C Lemmie Alliance PA-C    Orthopaedics Discharge Instructions   Weight bearing Status - Non-weight bearing - on right lower Extremity  Pain medication Per Prescriptions  Contact Office for Medication Refill- [phone]  Office can refill pain med every 7 days  If patient discharging to facility then pain control will be continued per facility physician  Ice to operative/injured site for 15-30 minutes of each hour for next 5 days    Recommend that you continue to ice the area 2-3 times per day after this   Elevate operative/injured limb on 2 pillows at home  Goal is to have limb above the heart if able  Continue DVT Prophylaxis (blood clot prevention) as Prescribed: Per Medicine Team   Fracture Care -  maintain knee immobilizer  Follow Up in Office in 1 weeks. Call the office at [phone] for directions or with any questions. Watch for these significant complications. Call physician if they or any other problems occur:  Fever over 101°, redness, swelling or warmth at the operative site  Unrelieved nausea    Foul smelling or cloudy drainage at the operative site   Unrelieved pain    Blood soaked dressing. (Some oozing may be normal)     Numb, pale, blue, cold or tingling extremity    Future Appointments   Date Time Provider Mc Tallye   10/21/2022  8:30 AM SCHEDULE, SE ORTHO RES SE Ortho [facility]       Directions to Orthopaedic Trauma Office at [facility]:   [address] [place] [address], [address] through the ED parking lot off [address]  At far side of the parking lot is [address] (large blue awning)-- Enter here  Our office is straight ahead through this entrance and check in will be on your left        It is the Department of Orthopaedic Trauma's standard of practice that providers will de-escalate(wean) all patients from narcotic(opioid) medications during the post-operative period. We provide multimodal pain control but opioid medications are tapered in all of our patients. If patient requires referral to pain management for prolonged taper off of opioid pain medication we will facilitate this process.
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
## 2022-10-13 NOTE — PROGRESS NOTES
Department of Orthopedic Surgery  Resident Progress Note    Patient seen and examined. Pain moderate but controlled. Still having right hip pain, rib pain, left elbow pain, and head pain. Denies any numbness or tingling, denies and motor weakness    VITALS:  BP (!) 96/57   Pulse 100   Temp 97.8 °F (36.6 °C) (Temporal)   Resp 18   Ht 5' 11\" (1.803 m)   Wt 154 lb (69.9 kg)   SpO2 99%   BMI 21.48 kg/m²     General: alert and oriented to person, place and time, well-developed and well-nourished, in no acute distress    MUSCULOSKELETAL:   right lower extremity:  Knee immobilizer in place  Compartments soft and compressible  +PF/DF/EHL  +2/4 DP & PT pulses, Brisk Cap refill, Toes warm and perfused  Distal sensation grossly intact to Peroneals, Sural, Saphenous, and tibial nrs    CBC:   Lab Results   Component Value Date/Time    WBC 10.1 10/12/2022 07:55 PM    HGB 17.1 10/12/2022 07:55 PM    HCT 49.1 10/12/2022 07:55 PM     10/12/2022 07:55 PM     PT/INR:    Lab Results   Component Value Date/Time    PROTIME 11.0 10/12/2022 07:55 PM    INR 1.0 10/12/2022 07:55 PM         ASSESSMENT  Closed right hip dislocation  Closed right posterior wall + posterior column acetabular fracture    PLAN      Continue physical therapy and protocol: NWB - RLE  Deep venous thrombosis prophylaxis - held, early mobilization  Fracture patter appears to be stable, but will discuss imaging and exam with attending.  Discussed possible EUA as well as possible operative fixation vs nonop with patient  Pain Control: IV and PO

## 2022-10-13 NOTE — CARE COORDINATION
10/13/2022 social work transition of care planning  Pt is from home with Gf and had been independent. Pt did not report having a pcp at this time. Pt pharmacy is [facility]. Explained sw role in transition of care planning. Plan depending on needs at discharge. Will need therapy evals to assist in transition of care planning. If acute rehab appropriate,pt prefers [facility]. Pt is hoping to return home. SBI completed with pt, referral to peer recovery x [phone].   Electronically signed by QUINTEN Serna on 10/13/2022 at 10:57 AM
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
## 2022-10-14 DIAGNOSIS — S72.001A CLOSED FRACTURE DISLOCATION OF RIGHT HIP JOINT, INITIAL ENCOUNTER (HCC): ICD-10-CM

## 2022-10-14 DIAGNOSIS — S32.444A CLOSED NONDISPLACED FRACTURE OF POSTERIOR COLUMN OF RIGHT ACETABULUM, INITIAL ENCOUNTER (HCC): Primary | ICD-10-CM

## 2022-10-14 LAB
ANION GAP SERPL CALCULATED.3IONS-SCNC: 6 MMOL/L (ref 7–16)
BASOPHILS ABSOLUTE: 0.03 E9/L (ref 0–0.2)
BASOPHILS RELATIVE PERCENT: 0.4 % (ref 0–2)
BUN BLDV-MCNC: 11 MG/DL (ref 6–20)
CALCIUM SERPL-MCNC: 8.4 MG/DL (ref 8.6–10.2)
CHLORIDE BLD-SCNC: 99 MMOL/L (ref 98–107)
CO2: 27 MMOL/L (ref 22–29)
CREAT SERPL-MCNC: 0.7 MG/DL (ref 0.7–1.2)
EOSINOPHILS ABSOLUTE: 0.14 E9/L (ref 0.05–0.5)
EOSINOPHILS RELATIVE PERCENT: 1.6 % (ref 0–6)
GFR AFRICAN AMERICAN: >60
GFR NON-AFRICAN AMERICAN: >60 ML/MIN/1.73
GLUCOSE BLD-MCNC: 100 MG/DL (ref 74–99)
HCT VFR BLD CALC: 43.7 % (ref 37–54)
HEMOGLOBIN: 14.7 G/DL (ref 12.5–16.5)
IMMATURE GRANULOCYTES #: 0.03 E9/L
IMMATURE GRANULOCYTES %: 0.4 % (ref 0–5)
LYMPHOCYTES ABSOLUTE: 2.07 E9/L (ref 1.5–4)
LYMPHOCYTES RELATIVE PERCENT: 24.4 % (ref 20–42)
MCH RBC QN AUTO: 30.7 PG (ref 26–35)
MCHC RBC AUTO-ENTMCNC: 33.6 % (ref 32–34.5)
MCV RBC AUTO: 91.2 FL (ref 80–99.9)
MONOCYTES ABSOLUTE: 0.74 E9/L (ref 0.1–0.95)
MONOCYTES RELATIVE PERCENT: 8.7 % (ref 2–12)
NEUTROPHILS ABSOLUTE: 5.49 E9/L (ref 1.8–7.3)
NEUTROPHILS RELATIVE PERCENT: 64.5 % (ref 43–80)
PDW BLD-RTO: 11.7 FL (ref 11.5–15)
PLATELET # BLD: 145 E9/L (ref 130–450)
PMV BLD AUTO: 9.1 FL (ref 7–12)
POTASSIUM REFLEX MAGNESIUM: 3.6 MMOL/L (ref 3.5–5)
RBC # BLD: 4.79 E12/L (ref 3.8–5.8)
SODIUM BLD-SCNC: 132 MMOL/L (ref 132–146)
WBC # BLD: 8.5 E9/L (ref 4.5–11.5)

## 2022-10-14 PROCEDURE — 99232 SBSQ HOSP IP/OBS MODERATE 35: CPT | Performed by: SURGERY

## 2022-10-14 PROCEDURE — 6370000000 HC RX 637 (ALT 250 FOR IP): Performed by: STUDENT IN AN ORGANIZED HEALTH CARE EDUCATION/TRAINING PROGRAM

## 2022-10-14 PROCEDURE — 97530 THERAPEUTIC ACTIVITIES: CPT

## 2022-10-14 PROCEDURE — 2580000003 HC RX 258: Performed by: STUDENT IN AN ORGANIZED HEALTH CARE EDUCATION/TRAINING PROGRAM

## 2022-10-14 PROCEDURE — 85025 COMPLETE CBC W/AUTO DIFF WBC: CPT

## 2022-10-14 PROCEDURE — 6370000000 HC RX 637 (ALT 250 FOR IP): Performed by: CLINICAL NURSE SPECIALIST

## 2022-10-14 PROCEDURE — 36415 COLL VENOUS BLD VENIPUNCTURE: CPT

## 2022-10-14 PROCEDURE — 6360000002 HC RX W HCPCS: Performed by: STUDENT IN AN ORGANIZED HEALTH CARE EDUCATION/TRAINING PROGRAM

## 2022-10-14 PROCEDURE — 1200000000 HC SEMI PRIVATE

## 2022-10-14 PROCEDURE — 80048 BASIC METABOLIC PNL TOTAL CA: CPT

## 2022-10-14 PROCEDURE — 6360000002 HC RX W HCPCS: Performed by: SURGERY

## 2022-10-14 RX ORDER — IBUPROFEN 400 MG/1
400 TABLET ORAL
Status: DISCONTINUED | OUTPATIENT
Start: 2022-10-14 | End: 2022-10-19 | Stop reason: HOSPADM

## 2022-10-14 RX ADMIN — ACETAMINOPHEN 650 MG: 325 TABLET, FILM COATED ORAL at 00:29

## 2022-10-14 RX ADMIN — METHOCARBAMOL TABLETS 1000 MG: 500 TABLET, COATED ORAL at 21:02

## 2022-10-14 RX ADMIN — OXYCODONE HYDROCHLORIDE 10 MG: 10 TABLET ORAL at 00:33

## 2022-10-14 RX ADMIN — POLYETHYLENE GLYCOL 3350 17 G: 17 POWDER, FOR SOLUTION ORAL at 10:06

## 2022-10-14 RX ADMIN — BACITRACIN ZINC AND POLYMYXIN B SULFATE: 500; 10000 OINTMENT OPHTHALMIC at 21:02

## 2022-10-14 RX ADMIN — METHOCARBAMOL TABLETS 1000 MG: 500 TABLET, COATED ORAL at 17:35

## 2022-10-14 RX ADMIN — IBUPROFEN 400 MG: 400 TABLET, FILM COATED ORAL at 12:15

## 2022-10-14 RX ADMIN — ACETAMINOPHEN 650 MG: 325 TABLET, FILM COATED ORAL at 05:38

## 2022-10-14 RX ADMIN — METHOCARBAMOL TABLETS 1000 MG: 500 TABLET, COATED ORAL at 10:06

## 2022-10-14 RX ADMIN — BACITRACIN ZINC: 500 OINTMENT TOPICAL at 17:35

## 2022-10-14 RX ADMIN — ACETAMINOPHEN 650 MG: 325 TABLET, FILM COATED ORAL at 17:35

## 2022-10-14 RX ADMIN — ENOXAPARIN SODIUM 30 MG: 100 INJECTION SUBCUTANEOUS at 21:01

## 2022-10-14 RX ADMIN — OXYCODONE HYDROCHLORIDE 10 MG: 10 TABLET ORAL at 13:29

## 2022-10-14 RX ADMIN — BISACODYL 5 MG: 5 TABLET, DELAYED RELEASE ORAL at 10:06

## 2022-10-14 RX ADMIN — HYDROMORPHONE HYDROCHLORIDE 0.5 MG: 1 INJECTION, SOLUTION INTRAMUSCULAR; INTRAVENOUS; SUBCUTANEOUS at 05:38

## 2022-10-14 RX ADMIN — IBUPROFEN 400 MG: 400 TABLET, FILM COATED ORAL at 21:02

## 2022-10-14 RX ADMIN — METHOCARBAMOL TABLETS 1000 MG: 500 TABLET, COATED ORAL at 13:28

## 2022-10-14 RX ADMIN — OXYCODONE HYDROCHLORIDE 10 MG: 10 TABLET ORAL at 23:49

## 2022-10-14 RX ADMIN — SODIUM CHLORIDE, PRESERVATIVE FREE 10 ML: 5 INJECTION INTRAVENOUS at 10:08

## 2022-10-14 RX ADMIN — ACETAMINOPHEN 650 MG: 325 TABLET, FILM COATED ORAL at 23:49

## 2022-10-14 RX ADMIN — OXYCODONE HYDROCHLORIDE 10 MG: 10 TABLET ORAL at 19:30

## 2022-10-14 RX ADMIN — ENOXAPARIN SODIUM 30 MG: 100 INJECTION SUBCUTANEOUS at 10:06

## 2022-10-14 RX ADMIN — ACETAMINOPHEN 650 MG: 325 TABLET, FILM COATED ORAL at 12:15

## 2022-10-14 RX ADMIN — BACITRACIN ZINC: 500 OINTMENT TOPICAL at 10:06

## 2022-10-14 RX ADMIN — BACITRACIN ZINC: 500 OINTMENT TOPICAL at 21:03

## 2022-10-14 RX ADMIN — IBUPROFEN 400 MG: 400 TABLET, FILM COATED ORAL at 17:34

## 2022-10-14 RX ADMIN — SODIUM CHLORIDE, PRESERVATIVE FREE 10 ML: 5 INJECTION INTRAVENOUS at 21:02

## 2022-10-14 ASSESSMENT — PAIN DESCRIPTION - ORIENTATION
ORIENTATION: RIGHT
ORIENTATION: RIGHT
ORIENTATION: OTHER (COMMENT)
ORIENTATION: RIGHT

## 2022-10-14 ASSESSMENT — PAIN DESCRIPTION - LOCATION
LOCATION: HIP
LOCATION: HIP;HEAD
LOCATION: LEG
LOCATION: PELVIS;RIB CAGE

## 2022-10-14 ASSESSMENT — PAIN - FUNCTIONAL ASSESSMENT: PAIN_FUNCTIONAL_ASSESSMENT: PREVENTS OR INTERFERES SOME ACTIVE ACTIVITIES AND ADLS

## 2022-10-14 ASSESSMENT — PAIN SCALES - GENERAL
PAINLEVEL_OUTOF10: 7
PAINLEVEL_OUTOF10: 0
PAINLEVEL_OUTOF10: 8
PAINLEVEL_OUTOF10: 9
PAINLEVEL_OUTOF10: 8
PAINLEVEL_OUTOF10: 6
PAINLEVEL_OUTOF10: 7
PAINLEVEL_OUTOF10: 5
PAINLEVEL_OUTOF10: 7
PAINLEVEL_OUTOF10: 7

## 2022-10-14 ASSESSMENT — PAIN DESCRIPTION - DESCRIPTORS
DESCRIPTORS: ACHING;DISCOMFORT;CRAMPING
DESCRIPTORS: ACHING;SORE
DESCRIPTORS: ACHING;DISCOMFORT;CRUSHING
DESCRIPTORS: ACHING;STABBING;SHARP;SORE
DESCRIPTORS: ACHING;SHARP
DESCRIPTORS: ACHING;DISCOMFORT;CRUSHING

## 2022-10-14 ASSESSMENT — PAIN DESCRIPTION - PAIN TYPE: TYPE: ACUTE PAIN

## 2022-10-14 ASSESSMENT — PAIN DESCRIPTION - ONSET: ONSET: GRADUAL

## 2022-10-14 ASSESSMENT — PAIN DESCRIPTION - FREQUENCY: FREQUENCY: CONTINUOUS

## 2022-10-14 NOTE — CARE COORDINATION
Peer Recovery Support Note    Name: Garry Arroyo  Date: 10/14/2022    No chief complaint on file. Peer Support met with patient. [x] Support and education provided  [] Resources provided   [x] Treatment referral: [facility]  [] Other:   [] Patient declined peer recovery services     Referred By: Angelita Emerson (ANISH)    Notes: Patient was open to support. He was offered resources and willing to follow up with peer when he is discharged.     Signed: Kristene Apgar, 10/14/2022
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
## 2022-10-14 NOTE — PLAN OF CARE
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
## 2022-10-14 NOTE — PROGRESS NOTES
Department of Orthopedic Surgery  Resident Progress Note    Patient seen and examined. Pain moderate but controlled. Still having right hip pain when mobile. Denies any numbness or tingling, denies and motor weakness. Attempted PT yesterday. VITALS:  /74   Pulse 86   Temp 97.5 °F (36.4 °C) (Axillary)   Resp 16   Ht 5' 11\" (1.803 m)   Wt 154 lb (69.9 kg)   SpO2 96%   BMI 21.48 kg/m²     General: alert and in no acute distress    MUSCULOSKELETAL:   right lower extremity:  Knee immobilizer in place  Compartments soft and compressible  +PF/DF/EHL  Brisk Cap refill, Toes warm and perfused  Distal sensation grossly intact to Peroneals, Sural, Saphenous, and tibial nrs    CBC:   Lab Results   Component Value Date/Time    WBC 11.8 10/13/2022 05:28 AM    HGB 15.6 10/13/2022 05:28 AM    HCT 45.9 10/13/2022 05:28 AM     10/13/2022 05:28 AM     PT/INR:    Lab Results   Component Value Date/Time    PROTIME 11.0 10/12/2022 07:55 PM    INR 1.0 10/12/2022 07:55 PM         ASSESSMENT  Closed right hip dislocation  Closed right posterior wall + posterior column acetabular fracture    PLAN      Continue physical therapy and protocol: NWB - R LE  Maintain knee immobilizer until follow up appt. Ok to remove while in bed  Deep venous thrombosis prophylaxis - ok for dvt ppx today from ortho pov. Defer specific therapy to admitting service, early mobilization  No operative intervention planned at this time. We will trial non operative management of his fracture at this time. PT/OT  Pain Control: IV and PO  Follow up outpatient with Dr. Tiago Ley in 1-2 weeks. Call for appointment  Ok to discharge from orthopedic standpoint. Orthopedics will follow the patient peripherally for the remainder of their inpatient stay. Please call with questions or concerns. Discussed with Dr. Tiago Ley.

## 2022-10-15 LAB
ANION GAP SERPL CALCULATED.3IONS-SCNC: 12 MMOL/L (ref 7–16)
BASOPHILS ABSOLUTE: 0.04 E9/L (ref 0–0.2)
BASOPHILS RELATIVE PERCENT: 0.5 % (ref 0–2)
BUN BLDV-MCNC: 11 MG/DL (ref 6–20)
CALCIUM SERPL-MCNC: 8.9 MG/DL (ref 8.6–10.2)
CHLORIDE BLD-SCNC: 103 MMOL/L (ref 98–107)
CO2: 21 MMOL/L (ref 22–29)
CREAT SERPL-MCNC: 0.7 MG/DL (ref 0.7–1.2)
EOSINOPHILS ABSOLUTE: 0.16 E9/L (ref 0.05–0.5)
EOSINOPHILS RELATIVE PERCENT: 2.2 % (ref 0–6)
GFR AFRICAN AMERICAN: >60
GFR NON-AFRICAN AMERICAN: >60 ML/MIN/1.73
GLUCOSE BLD-MCNC: 96 MG/DL (ref 74–99)
HCT VFR BLD CALC: 43.3 % (ref 37–54)
HEMOGLOBIN: 14.7 G/DL (ref 12.5–16.5)
IMMATURE GRANULOCYTES #: 0.04 E9/L
IMMATURE GRANULOCYTES %: 0.5 % (ref 0–5)
LYMPHOCYTES ABSOLUTE: 1.64 E9/L (ref 1.5–4)
LYMPHOCYTES RELATIVE PERCENT: 22.2 % (ref 20–42)
MCH RBC QN AUTO: 30.7 PG (ref 26–35)
MCHC RBC AUTO-ENTMCNC: 33.9 % (ref 32–34.5)
MCV RBC AUTO: 90.4 FL (ref 80–99.9)
MONOCYTES ABSOLUTE: 0.57 E9/L (ref 0.1–0.95)
MONOCYTES RELATIVE PERCENT: 7.7 % (ref 2–12)
NEUTROPHILS ABSOLUTE: 4.94 E9/L (ref 1.8–7.3)
NEUTROPHILS RELATIVE PERCENT: 66.9 % (ref 43–80)
PDW BLD-RTO: 11.7 FL (ref 11.5–15)
PLATELET # BLD: 140 E9/L (ref 130–450)
PMV BLD AUTO: 9.3 FL (ref 7–12)
POTASSIUM REFLEX MAGNESIUM: 4 MMOL/L (ref 3.5–5)
RBC # BLD: 4.79 E12/L (ref 3.8–5.8)
SODIUM BLD-SCNC: 136 MMOL/L (ref 132–146)
WBC # BLD: 7.4 E9/L (ref 4.5–11.5)

## 2022-10-15 PROCEDURE — 2580000003 HC RX 258: Performed by: STUDENT IN AN ORGANIZED HEALTH CARE EDUCATION/TRAINING PROGRAM

## 2022-10-15 PROCEDURE — 6370000000 HC RX 637 (ALT 250 FOR IP): Performed by: CLINICAL NURSE SPECIALIST

## 2022-10-15 PROCEDURE — 85025 COMPLETE CBC W/AUTO DIFF WBC: CPT

## 2022-10-15 PROCEDURE — 1200000000 HC SEMI PRIVATE

## 2022-10-15 PROCEDURE — 36415 COLL VENOUS BLD VENIPUNCTURE: CPT

## 2022-10-15 PROCEDURE — 6370000000 HC RX 637 (ALT 250 FOR IP): Performed by: STUDENT IN AN ORGANIZED HEALTH CARE EDUCATION/TRAINING PROGRAM

## 2022-10-15 PROCEDURE — 99232 SBSQ HOSP IP/OBS MODERATE 35: CPT | Performed by: SURGERY

## 2022-10-15 PROCEDURE — 80048 BASIC METABOLIC PNL TOTAL CA: CPT

## 2022-10-15 PROCEDURE — 6360000002 HC RX W HCPCS: Performed by: SURGERY

## 2022-10-15 RX ADMIN — OXYCODONE HYDROCHLORIDE 10 MG: 10 TABLET ORAL at 15:21

## 2022-10-15 RX ADMIN — METHOCARBAMOL TABLETS 1000 MG: 500 TABLET, COATED ORAL at 18:08

## 2022-10-15 RX ADMIN — ACETAMINOPHEN 650 MG: 325 TABLET, FILM COATED ORAL at 23:34

## 2022-10-15 RX ADMIN — IBUPROFEN 400 MG: 400 TABLET, FILM COATED ORAL at 13:27

## 2022-10-15 RX ADMIN — METHOCARBAMOL TABLETS 1000 MG: 500 TABLET, COATED ORAL at 13:27

## 2022-10-15 RX ADMIN — SODIUM CHLORIDE, PRESERVATIVE FREE 10 ML: 5 INJECTION INTRAVENOUS at 09:22

## 2022-10-15 RX ADMIN — BACITRACIN ZINC: 500 OINTMENT TOPICAL at 15:22

## 2022-10-15 RX ADMIN — ENOXAPARIN SODIUM 30 MG: 100 INJECTION SUBCUTANEOUS at 09:21

## 2022-10-15 RX ADMIN — ACETAMINOPHEN 650 MG: 325 TABLET, FILM COATED ORAL at 18:08

## 2022-10-15 RX ADMIN — METHOCARBAMOL TABLETS 1000 MG: 500 TABLET, COATED ORAL at 09:23

## 2022-10-15 RX ADMIN — ACETAMINOPHEN 650 MG: 325 TABLET, FILM COATED ORAL at 13:27

## 2022-10-15 RX ADMIN — ACETAMINOPHEN 650 MG: 325 TABLET, FILM COATED ORAL at 05:37

## 2022-10-15 RX ADMIN — POLYETHYLENE GLYCOL 3350 17 G: 17 POWDER, FOR SOLUTION ORAL at 09:22

## 2022-10-15 RX ADMIN — BACITRACIN ZINC: 500 OINTMENT TOPICAL at 09:23

## 2022-10-15 RX ADMIN — SODIUM CHLORIDE, PRESERVATIVE FREE 10 ML: 5 INJECTION INTRAVENOUS at 20:16

## 2022-10-15 RX ADMIN — OXYCODONE HYDROCHLORIDE 10 MG: 10 TABLET ORAL at 09:22

## 2022-10-15 RX ADMIN — BACITRACIN ZINC AND POLYMYXIN B SULFATE: 500; 10000 OINTMENT OPHTHALMIC at 09:24

## 2022-10-15 RX ADMIN — IBUPROFEN 400 MG: 400 TABLET, FILM COATED ORAL at 20:15

## 2022-10-15 RX ADMIN — BISACODYL 5 MG: 5 TABLET, DELAYED RELEASE ORAL at 09:22

## 2022-10-15 RX ADMIN — BACITRACIN ZINC: 500 OINTMENT TOPICAL at 20:14

## 2022-10-15 RX ADMIN — ENOXAPARIN SODIUM 30 MG: 100 INJECTION SUBCUTANEOUS at 20:15

## 2022-10-15 RX ADMIN — OXYCODONE HYDROCHLORIDE 10 MG: 10 TABLET ORAL at 20:15

## 2022-10-15 RX ADMIN — IBUPROFEN 400 MG: 400 TABLET, FILM COATED ORAL at 09:23

## 2022-10-15 RX ADMIN — METHOCARBAMOL TABLETS 1000 MG: 500 TABLET, COATED ORAL at 20:15

## 2022-10-15 RX ADMIN — IBUPROFEN 400 MG: 400 TABLET, FILM COATED ORAL at 18:08

## 2022-10-15 ASSESSMENT — PAIN DESCRIPTION - DESCRIPTORS
DESCRIPTORS: SORE;SHOOTING;SHARP
DESCRIPTORS: ACHING;DISCOMFORT;SORE
DESCRIPTORS: ACHING
DESCRIPTORS: ACHING;DISCOMFORT;SORE

## 2022-10-15 ASSESSMENT — PAIN SCALES - GENERAL
PAINLEVEL_OUTOF10: 0
PAINLEVEL_OUTOF10: 7
PAINLEVEL_OUTOF10: 8
PAINLEVEL_OUTOF10: 6
PAINLEVEL_OUTOF10: 10

## 2022-10-15 ASSESSMENT — PAIN DESCRIPTION - ORIENTATION
ORIENTATION: RIGHT
ORIENTATION: LEFT
ORIENTATION: RIGHT

## 2022-10-15 ASSESSMENT — PAIN DESCRIPTION - LOCATION
LOCATION: HIP
LOCATION: HIP;LEG
LOCATION: HIP;LEG
LOCATION: HIP
LOCATION: LEG

## 2022-10-15 ASSESSMENT — PAIN - FUNCTIONAL ASSESSMENT
PAIN_FUNCTIONAL_ASSESSMENT: PREVENTS OR INTERFERES SOME ACTIVE ACTIVITIES AND ADLS
PAIN_FUNCTIONAL_ASSESSMENT: PREVENTS OR INTERFERES SOME ACTIVE ACTIVITIES AND ADLS

## 2022-10-15 ASSESSMENT — PAIN SCALES - WONG BAKER: WONGBAKER_NUMERICALRESPONSE: 0
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
## 2022-10-16 LAB
ANION GAP SERPL CALCULATED.3IONS-SCNC: 9 MMOL/L (ref 7–16)
BASOPHILS ABSOLUTE: 0.03 E9/L (ref 0–0.2)
BASOPHILS RELATIVE PERCENT: 0.4 % (ref 0–2)
BUN BLDV-MCNC: 15 MG/DL (ref 6–20)
CALCIUM SERPL-MCNC: 8.9 MG/DL (ref 8.6–10.2)
CHLORIDE BLD-SCNC: 104 MMOL/L (ref 98–107)
CO2: 25 MMOL/L (ref 22–29)
CREAT SERPL-MCNC: 0.8 MG/DL (ref 0.7–1.2)
EOSINOPHILS ABSOLUTE: 0.21 E9/L (ref 0.05–0.5)
EOSINOPHILS RELATIVE PERCENT: 2.8 % (ref 0–6)
GFR AFRICAN AMERICAN: >60
GFR NON-AFRICAN AMERICAN: >60 ML/MIN/1.73
GLUCOSE BLD-MCNC: 95 MG/DL (ref 74–99)
HCT VFR BLD CALC: 42.9 % (ref 37–54)
HEMOGLOBIN: 14.9 G/DL (ref 12.5–16.5)
IMMATURE GRANULOCYTES #: 0.04 E9/L
IMMATURE GRANULOCYTES %: 0.5 % (ref 0–5)
LYMPHOCYTES ABSOLUTE: 1.73 E9/L (ref 1.5–4)
LYMPHOCYTES RELATIVE PERCENT: 23.3 % (ref 20–42)
MCH RBC QN AUTO: 30.8 PG (ref 26–35)
MCHC RBC AUTO-ENTMCNC: 34.7 % (ref 32–34.5)
MCV RBC AUTO: 88.8 FL (ref 80–99.9)
MONOCYTES ABSOLUTE: 0.55 E9/L (ref 0.1–0.95)
MONOCYTES RELATIVE PERCENT: 7.4 % (ref 2–12)
NEUTROPHILS ABSOLUTE: 4.87 E9/L (ref 1.8–7.3)
NEUTROPHILS RELATIVE PERCENT: 65.6 % (ref 43–80)
PDW BLD-RTO: 11.4 FL (ref 11.5–15)
PLATELET # BLD: 171 E9/L (ref 130–450)
PMV BLD AUTO: 9.5 FL (ref 7–12)
POTASSIUM REFLEX MAGNESIUM: 3.7 MMOL/L (ref 3.5–5)
RBC # BLD: 4.83 E12/L (ref 3.8–5.8)
SODIUM BLD-SCNC: 138 MMOL/L (ref 132–146)
WBC # BLD: 7.4 E9/L (ref 4.5–11.5)

## 2022-10-16 PROCEDURE — 1200000000 HC SEMI PRIVATE

## 2022-10-16 PROCEDURE — 6370000000 HC RX 637 (ALT 250 FOR IP): Performed by: STUDENT IN AN ORGANIZED HEALTH CARE EDUCATION/TRAINING PROGRAM

## 2022-10-16 PROCEDURE — 99232 SBSQ HOSP IP/OBS MODERATE 35: CPT | Performed by: SURGERY

## 2022-10-16 PROCEDURE — 6360000002 HC RX W HCPCS: Performed by: SURGERY

## 2022-10-16 PROCEDURE — 2580000003 HC RX 258: Performed by: STUDENT IN AN ORGANIZED HEALTH CARE EDUCATION/TRAINING PROGRAM

## 2022-10-16 PROCEDURE — 85025 COMPLETE CBC W/AUTO DIFF WBC: CPT

## 2022-10-16 PROCEDURE — 6370000000 HC RX 637 (ALT 250 FOR IP): Performed by: CLINICAL NURSE SPECIALIST

## 2022-10-16 PROCEDURE — 36415 COLL VENOUS BLD VENIPUNCTURE: CPT

## 2022-10-16 PROCEDURE — 80048 BASIC METABOLIC PNL TOTAL CA: CPT

## 2022-10-16 RX ADMIN — OXYCODONE HYDROCHLORIDE 10 MG: 10 TABLET ORAL at 13:20

## 2022-10-16 RX ADMIN — OXYCODONE HYDROCHLORIDE 10 MG: 10 TABLET ORAL at 09:12

## 2022-10-16 RX ADMIN — BACITRACIN ZINC: 500 OINTMENT TOPICAL at 13:20

## 2022-10-16 RX ADMIN — METHOCARBAMOL TABLETS 1000 MG: 500 TABLET, COATED ORAL at 21:20

## 2022-10-16 RX ADMIN — BACITRACIN ZINC AND POLYMYXIN B SULFATE: 500; 10000 OINTMENT OPHTHALMIC at 21:20

## 2022-10-16 RX ADMIN — IBUPROFEN 400 MG: 400 TABLET, FILM COATED ORAL at 12:12

## 2022-10-16 RX ADMIN — METHOCARBAMOL TABLETS 1000 MG: 500 TABLET, COATED ORAL at 09:00

## 2022-10-16 RX ADMIN — OXYCODONE HYDROCHLORIDE 10 MG: 10 TABLET ORAL at 00:35

## 2022-10-16 RX ADMIN — BACITRACIN ZINC AND POLYMYXIN B SULFATE: 500; 10000 OINTMENT OPHTHALMIC at 09:04

## 2022-10-16 RX ADMIN — ACETAMINOPHEN 650 MG: 325 TABLET, FILM COATED ORAL at 12:12

## 2022-10-16 RX ADMIN — BACITRACIN ZINC: 500 OINTMENT TOPICAL at 21:20

## 2022-10-16 RX ADMIN — IBUPROFEN 400 MG: 400 TABLET, FILM COATED ORAL at 09:00

## 2022-10-16 RX ADMIN — SODIUM CHLORIDE, PRESERVATIVE FREE 10 ML: 5 INJECTION INTRAVENOUS at 08:59

## 2022-10-16 RX ADMIN — POLYETHYLENE GLYCOL 3350 17 G: 17 POWDER, FOR SOLUTION ORAL at 08:59

## 2022-10-16 RX ADMIN — METHOCARBAMOL TABLETS 1000 MG: 500 TABLET, COATED ORAL at 13:20

## 2022-10-16 RX ADMIN — BISACODYL 5 MG: 5 TABLET, DELAYED RELEASE ORAL at 08:59

## 2022-10-16 RX ADMIN — OXYCODONE HYDROCHLORIDE 10 MG: 10 TABLET ORAL at 21:21

## 2022-10-16 RX ADMIN — ENOXAPARIN SODIUM 30 MG: 100 INJECTION SUBCUTANEOUS at 08:59

## 2022-10-16 RX ADMIN — IBUPROFEN 400 MG: 400 TABLET, FILM COATED ORAL at 21:21

## 2022-10-16 RX ADMIN — SODIUM CHLORIDE, PRESERVATIVE FREE 10 ML: 5 INJECTION INTRAVENOUS at 21:20

## 2022-10-16 RX ADMIN — ACETAMINOPHEN 650 MG: 325 TABLET, FILM COATED ORAL at 18:12

## 2022-10-16 RX ADMIN — METHOCARBAMOL TABLETS 1000 MG: 500 TABLET, COATED ORAL at 18:13

## 2022-10-16 RX ADMIN — OXYCODONE HYDROCHLORIDE 10 MG: 10 TABLET ORAL at 17:29

## 2022-10-16 RX ADMIN — ENOXAPARIN SODIUM 30 MG: 100 INJECTION SUBCUTANEOUS at 21:20

## 2022-10-16 RX ADMIN — BACITRACIN ZINC: 500 OINTMENT TOPICAL at 08:58

## 2022-10-16 ASSESSMENT — PAIN SCALES - GENERAL
PAINLEVEL_OUTOF10: 6
PAINLEVEL_OUTOF10: 8
PAINLEVEL_OUTOF10: 5
PAINLEVEL_OUTOF10: 7
PAINLEVEL_OUTOF10: 5
PAINLEVEL_OUTOF10: 6
PAINLEVEL_OUTOF10: 7
PAINLEVEL_OUTOF10: 7

## 2022-10-16 ASSESSMENT — PAIN DESCRIPTION - ORIENTATION
ORIENTATION: RIGHT
ORIENTATION: RIGHT
ORIENTATION: ANTERIOR
ORIENTATION: RIGHT
ORIENTATION: RIGHT

## 2022-10-16 ASSESSMENT — PAIN DESCRIPTION - LOCATION
LOCATION: RIB CAGE;HIP
LOCATION: HIP
LOCATION: LEG
LOCATION: HIP;RIB CAGE
LOCATION: HEAD
LOCATION: HIP

## 2022-10-16 ASSESSMENT — PAIN DESCRIPTION - DESCRIPTORS
DESCRIPTORS: ACHING;DISCOMFORT;SORE
DESCRIPTORS: ACHING;DISCOMFORT;THROBBING
DESCRIPTORS: ACHING;DISCOMFORT;SORE
DESCRIPTORS: BURNING
DESCRIPTORS: SORE
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
## 2022-10-17 PROBLEM — S73.004A HIP DISLOCATION, RIGHT (HCC): Status: ACTIVE | Noted: 2022-10-17

## 2022-10-17 LAB
ANION GAP SERPL CALCULATED.3IONS-SCNC: 14 MMOL/L (ref 7–16)
BASOPHILS ABSOLUTE: 0.04 E9/L (ref 0–0.2)
BASOPHILS RELATIVE PERCENT: 0.5 % (ref 0–2)
BUN BLDV-MCNC: 20 MG/DL (ref 6–20)
CALCIUM SERPL-MCNC: 9.5 MG/DL (ref 8.6–10.2)
CHLORIDE BLD-SCNC: 101 MMOL/L (ref 98–107)
CO2: 22 MMOL/L (ref 22–29)
CREAT SERPL-MCNC: 1 MG/DL (ref 0.7–1.2)
EOSINOPHILS ABSOLUTE: 0.23 E9/L (ref 0.05–0.5)
EOSINOPHILS RELATIVE PERCENT: 2.9 % (ref 0–6)
GFR AFRICAN AMERICAN: >60
GFR NON-AFRICAN AMERICAN: >60 ML/MIN/1.73
GLUCOSE BLD-MCNC: 101 MG/DL (ref 74–99)
HCT VFR BLD CALC: 44.4 % (ref 37–54)
HEMOGLOBIN: 15.3 G/DL (ref 12.5–16.5)
IMMATURE GRANULOCYTES #: 0.05 E9/L
IMMATURE GRANULOCYTES %: 0.6 % (ref 0–5)
LYMPHOCYTES ABSOLUTE: 1.22 E9/L (ref 1.5–4)
LYMPHOCYTES RELATIVE PERCENT: 15.1 % (ref 20–42)
MCH RBC QN AUTO: 30.5 PG (ref 26–35)
MCHC RBC AUTO-ENTMCNC: 34.5 % (ref 32–34.5)
MCV RBC AUTO: 88.4 FL (ref 80–99.9)
MONOCYTES ABSOLUTE: 0.58 E9/L (ref 0.1–0.95)
MONOCYTES RELATIVE PERCENT: 7.2 % (ref 2–12)
NEUTROPHILS ABSOLUTE: 5.95 E9/L (ref 1.8–7.3)
NEUTROPHILS RELATIVE PERCENT: 73.7 % (ref 43–80)
PDW BLD-RTO: 11.6 FL (ref 11.5–15)
PLATELET # BLD: 194 E9/L (ref 130–450)
PMV BLD AUTO: 9.3 FL (ref 7–12)
POTASSIUM REFLEX MAGNESIUM: 3.9 MMOL/L (ref 3.5–5)
RBC # BLD: 5.02 E12/L (ref 3.8–5.8)
SODIUM BLD-SCNC: 137 MMOL/L (ref 132–146)
WBC # BLD: 8.1 E9/L (ref 4.5–11.5)

## 2022-10-17 PROCEDURE — 2580000003 HC RX 258: Performed by: STUDENT IN AN ORGANIZED HEALTH CARE EDUCATION/TRAINING PROGRAM

## 2022-10-17 PROCEDURE — 99232 SBSQ HOSP IP/OBS MODERATE 35: CPT | Performed by: SURGERY

## 2022-10-17 PROCEDURE — 6370000000 HC RX 637 (ALT 250 FOR IP): Performed by: STUDENT IN AN ORGANIZED HEALTH CARE EDUCATION/TRAINING PROGRAM

## 2022-10-17 PROCEDURE — 80048 BASIC METABOLIC PNL TOTAL CA: CPT

## 2022-10-17 PROCEDURE — 6360000002 HC RX W HCPCS: Performed by: SURGERY

## 2022-10-17 PROCEDURE — 6370000000 HC RX 637 (ALT 250 FOR IP): Performed by: NURSE PRACTITIONER

## 2022-10-17 PROCEDURE — 36415 COLL VENOUS BLD VENIPUNCTURE: CPT

## 2022-10-17 PROCEDURE — 85025 COMPLETE CBC W/AUTO DIFF WBC: CPT

## 2022-10-17 PROCEDURE — 6370000000 HC RX 637 (ALT 250 FOR IP): Performed by: CLINICAL NURSE SPECIALIST

## 2022-10-17 PROCEDURE — 97530 THERAPEUTIC ACTIVITIES: CPT

## 2022-10-17 PROCEDURE — 97535 SELF CARE MNGMENT TRAINING: CPT

## 2022-10-17 PROCEDURE — 1200000000 HC SEMI PRIVATE

## 2022-10-17 RX ORDER — BISACODYL 10 MG
10 SUPPOSITORY, RECTAL RECTAL DAILY
Status: DISCONTINUED | OUTPATIENT
Start: 2022-10-17 | End: 2022-10-19 | Stop reason: HOSPADM

## 2022-10-17 RX ADMIN — ACETAMINOPHEN 650 MG: 325 TABLET, FILM COATED ORAL at 12:03

## 2022-10-17 RX ADMIN — IBUPROFEN 400 MG: 400 TABLET, FILM COATED ORAL at 21:05

## 2022-10-17 RX ADMIN — IBUPROFEN 400 MG: 400 TABLET, FILM COATED ORAL at 12:03

## 2022-10-17 RX ADMIN — POLYETHYLENE GLYCOL 3350 17 G: 17 POWDER, FOR SOLUTION ORAL at 09:00

## 2022-10-17 RX ADMIN — OXYCODONE HYDROCHLORIDE 10 MG: 10 TABLET ORAL at 23:41

## 2022-10-17 RX ADMIN — OXYCODONE HYDROCHLORIDE 10 MG: 10 TABLET ORAL at 10:13

## 2022-10-17 RX ADMIN — BACITRACIN ZINC: 500 OINTMENT TOPICAL at 14:21

## 2022-10-17 RX ADMIN — SODIUM CHLORIDE, PRESERVATIVE FREE 10 ML: 5 INJECTION INTRAVENOUS at 21:05

## 2022-10-17 RX ADMIN — BACITRACIN ZINC AND POLYMYXIN B SULFATE: 500; 10000 OINTMENT OPHTHALMIC at 22:26

## 2022-10-17 RX ADMIN — ACETAMINOPHEN 650 MG: 325 TABLET, FILM COATED ORAL at 23:41

## 2022-10-17 RX ADMIN — OXYCODONE HYDROCHLORIDE 10 MG: 10 TABLET ORAL at 19:38

## 2022-10-17 RX ADMIN — MAGNESIUM HYDROXIDE 30 ML: 400 SUSPENSION ORAL at 12:02

## 2022-10-17 RX ADMIN — ENOXAPARIN SODIUM 30 MG: 100 INJECTION SUBCUTANEOUS at 08:59

## 2022-10-17 RX ADMIN — BACITRACIN ZINC AND POLYMYXIN B SULFATE: 500; 10000 OINTMENT OPHTHALMIC at 09:06

## 2022-10-17 RX ADMIN — BACITRACIN ZINC: 500 OINTMENT TOPICAL at 22:25

## 2022-10-17 RX ADMIN — IBUPROFEN 400 MG: 400 TABLET, FILM COATED ORAL at 09:00

## 2022-10-17 RX ADMIN — BACITRACIN ZINC: 500 OINTMENT TOPICAL at 09:06

## 2022-10-17 RX ADMIN — METHOCARBAMOL TABLETS 1000 MG: 500 TABLET, COATED ORAL at 18:36

## 2022-10-17 RX ADMIN — METHOCARBAMOL TABLETS 1000 MG: 500 TABLET, COATED ORAL at 21:05

## 2022-10-17 RX ADMIN — IBUPROFEN 400 MG: 400 TABLET, FILM COATED ORAL at 18:37

## 2022-10-17 RX ADMIN — BISACODYL 10 MG: 10 SUPPOSITORY RECTAL at 18:38

## 2022-10-17 RX ADMIN — OXYCODONE HYDROCHLORIDE 10 MG: 10 TABLET ORAL at 14:26

## 2022-10-17 RX ADMIN — ACETAMINOPHEN 650 MG: 325 TABLET, FILM COATED ORAL at 18:37

## 2022-10-17 RX ADMIN — ACETAMINOPHEN 650 MG: 325 TABLET, FILM COATED ORAL at 06:06

## 2022-10-17 RX ADMIN — ENOXAPARIN SODIUM 30 MG: 100 INJECTION SUBCUTANEOUS at 21:05

## 2022-10-17 RX ADMIN — METHOCARBAMOL TABLETS 1000 MG: 500 TABLET, COATED ORAL at 09:00

## 2022-10-17 RX ADMIN — SODIUM CHLORIDE, PRESERVATIVE FREE 10 ML: 5 INJECTION INTRAVENOUS at 09:00

## 2022-10-17 RX ADMIN — METHOCARBAMOL TABLETS 1000 MG: 500 TABLET, COATED ORAL at 12:02

## 2022-10-17 RX ADMIN — OXYCODONE HYDROCHLORIDE 10 MG: 10 TABLET ORAL at 06:06

## 2022-10-17 RX ADMIN — BISACODYL 5 MG: 5 TABLET, DELAYED RELEASE ORAL at 08:59

## 2022-10-17 ASSESSMENT — PAIN - FUNCTIONAL ASSESSMENT
PAIN_FUNCTIONAL_ASSESSMENT: PREVENTS OR INTERFERES SOME ACTIVE ACTIVITIES AND ADLS
PAIN_FUNCTIONAL_ASSESSMENT: PREVENTS OR INTERFERES SOME ACTIVE ACTIVITIES AND ADLS
PAIN_FUNCTIONAL_ASSESSMENT: PREVENTS OR INTERFERES WITH MANY ACTIVE NOT PASSIVE ACTIVITIES
PAIN_FUNCTIONAL_ASSESSMENT: PREVENTS OR INTERFERES WITH MANY ACTIVE NOT PASSIVE ACTIVITIES

## 2022-10-17 ASSESSMENT — PAIN DESCRIPTION - ORIENTATION
ORIENTATION: RIGHT
ORIENTATION: RIGHT
ORIENTATION: ANTERIOR
ORIENTATION: RIGHT
ORIENTATION: ANTERIOR
ORIENTATION: RIGHT
ORIENTATION: RIGHT

## 2022-10-17 ASSESSMENT — PAIN SCALES - GENERAL
PAINLEVEL_OUTOF10: 7
PAINLEVEL_OUTOF10: 5
PAINLEVEL_OUTOF10: 1
PAINLEVEL_OUTOF10: 5
PAINLEVEL_OUTOF10: 5
PAINLEVEL_OUTOF10: 7
PAINLEVEL_OUTOF10: 7
PAINLEVEL_OUTOF10: 2
PAINLEVEL_OUTOF10: 4
PAINLEVEL_OUTOF10: 5

## 2022-10-17 ASSESSMENT — PAIN DESCRIPTION - DESCRIPTORS
DESCRIPTORS: BURNING
DESCRIPTORS: BURNING
DESCRIPTORS: ACHING;DISCOMFORT;DULL
DESCRIPTORS: ACHING;DISCOMFORT;DULL
DESCRIPTORS: ACHING
DESCRIPTORS: ACHING;DISCOMFORT;DULL
DESCRIPTORS: SORE

## 2022-10-17 ASSESSMENT — PAIN DESCRIPTION - FREQUENCY
FREQUENCY: CONTINUOUS
FREQUENCY: INTERMITTENT

## 2022-10-17 ASSESSMENT — PAIN DESCRIPTION - PAIN TYPE
TYPE: ACUTE PAIN
TYPE: SURGICAL PAIN

## 2022-10-17 ASSESSMENT — PAIN DESCRIPTION - ONSET
ONSET: ON-GOING
ONSET: GRADUAL

## 2022-10-17 ASSESSMENT — PAIN DESCRIPTION - LOCATION
LOCATION: HIP
LOCATION: LEG
LOCATION: LEG
LOCATION: HEAD
LOCATION: HEAD
LOCATION: LEG;HIP
LOCATION: HIP
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
## 2022-10-17 NOTE — CARE COORDINATION
10/17/2022 social work transition of care planning  Sw notified that [facility] can accept and ill initiate precert once updated therapy in chart. Sw called x [phone] to requested updated therapy.   Electronically signed by QUINTEN Ambriz on 10/17/2022 at 9:47 AM

## 2022-10-18 LAB
ANION GAP SERPL CALCULATED.3IONS-SCNC: 10 MMOL/L (ref 7–16)
BASOPHILS ABSOLUTE: 0.05 E9/L (ref 0–0.2)
BASOPHILS RELATIVE PERCENT: 0.6 % (ref 0–2)
BUN BLDV-MCNC: 22 MG/DL (ref 6–20)
CALCIUM SERPL-MCNC: 9.5 MG/DL (ref 8.6–10.2)
CHLORIDE BLD-SCNC: 99 MMOL/L (ref 98–107)
CO2: 28 MMOL/L (ref 22–29)
CREAT SERPL-MCNC: 0.8 MG/DL (ref 0.7–1.2)
EOSINOPHILS ABSOLUTE: 0.21 E9/L (ref 0.05–0.5)
EOSINOPHILS RELATIVE PERCENT: 2.7 % (ref 0–6)
GFR SERPL CREATININE-BSD FRML MDRD: >60 ML/MIN/1.73
GLUCOSE BLD-MCNC: 93 MG/DL (ref 74–99)
HCT VFR BLD CALC: 44.2 % (ref 37–54)
HEMOGLOBIN: 15.2 G/DL (ref 12.5–16.5)
IMMATURE GRANULOCYTES #: 0.05 E9/L
IMMATURE GRANULOCYTES %: 0.6 % (ref 0–5)
LYMPHOCYTES ABSOLUTE: 1.74 E9/L (ref 1.5–4)
LYMPHOCYTES RELATIVE PERCENT: 22.2 % (ref 20–42)
MCH RBC QN AUTO: 31.3 PG (ref 26–35)
MCHC RBC AUTO-ENTMCNC: 34.4 % (ref 32–34.5)
MCV RBC AUTO: 91.1 FL (ref 80–99.9)
MONOCYTES ABSOLUTE: 0.73 E9/L (ref 0.1–0.95)
MONOCYTES RELATIVE PERCENT: 9.3 % (ref 2–12)
NEUTROPHILS ABSOLUTE: 5.07 E9/L (ref 1.8–7.3)
NEUTROPHILS RELATIVE PERCENT: 64.6 % (ref 43–80)
PDW BLD-RTO: 11.6 FL (ref 11.5–15)
PLATELET # BLD: 206 E9/L (ref 130–450)
PMV BLD AUTO: 9.4 FL (ref 7–12)
POTASSIUM REFLEX MAGNESIUM: 4.5 MMOL/L (ref 3.5–5)
RBC # BLD: 4.85 E12/L (ref 3.8–5.8)
SODIUM BLD-SCNC: 137 MMOL/L (ref 132–146)
WBC # BLD: 7.9 E9/L (ref 4.5–11.5)

## 2022-10-18 PROCEDURE — 6370000000 HC RX 637 (ALT 250 FOR IP): Performed by: STUDENT IN AN ORGANIZED HEALTH CARE EDUCATION/TRAINING PROGRAM

## 2022-10-18 PROCEDURE — 6370000000 HC RX 637 (ALT 250 FOR IP): Performed by: CLINICAL NURSE SPECIALIST

## 2022-10-18 PROCEDURE — 99232 SBSQ HOSP IP/OBS MODERATE 35: CPT | Performed by: SURGERY

## 2022-10-18 PROCEDURE — 2580000003 HC RX 258: Performed by: STUDENT IN AN ORGANIZED HEALTH CARE EDUCATION/TRAINING PROGRAM

## 2022-10-18 PROCEDURE — 1200000000 HC SEMI PRIVATE

## 2022-10-18 PROCEDURE — 36415 COLL VENOUS BLD VENIPUNCTURE: CPT

## 2022-10-18 PROCEDURE — 6360000002 HC RX W HCPCS: Performed by: SURGERY

## 2022-10-18 PROCEDURE — 80048 BASIC METABOLIC PNL TOTAL CA: CPT

## 2022-10-18 PROCEDURE — 85025 COMPLETE CBC W/AUTO DIFF WBC: CPT

## 2022-10-18 RX ADMIN — IBUPROFEN 400 MG: 400 TABLET, FILM COATED ORAL at 08:36

## 2022-10-18 RX ADMIN — METHOCARBAMOL TABLETS 1000 MG: 500 TABLET, COATED ORAL at 12:49

## 2022-10-18 RX ADMIN — OXYCODONE HYDROCHLORIDE 10 MG: 10 TABLET ORAL at 08:36

## 2022-10-18 RX ADMIN — OXYCODONE HYDROCHLORIDE 10 MG: 10 TABLET ORAL at 22:05

## 2022-10-18 RX ADMIN — METHOCARBAMOL TABLETS 1000 MG: 500 TABLET, COATED ORAL at 22:04

## 2022-10-18 RX ADMIN — IBUPROFEN 400 MG: 400 TABLET, FILM COATED ORAL at 12:49

## 2022-10-18 RX ADMIN — SODIUM CHLORIDE, PRESERVATIVE FREE 10 ML: 5 INJECTION INTRAVENOUS at 22:06

## 2022-10-18 RX ADMIN — METHOCARBAMOL TABLETS 1000 MG: 500 TABLET, COATED ORAL at 16:49

## 2022-10-18 RX ADMIN — METHOCARBAMOL TABLETS 1000 MG: 500 TABLET, COATED ORAL at 08:35

## 2022-10-18 RX ADMIN — OXYCODONE HYDROCHLORIDE 10 MG: 10 TABLET ORAL at 12:49

## 2022-10-18 RX ADMIN — ACETAMINOPHEN 650 MG: 325 TABLET, FILM COATED ORAL at 05:51

## 2022-10-18 RX ADMIN — OXYCODONE HYDROCHLORIDE 5 MG: 5 TABLET ORAL at 04:44

## 2022-10-18 RX ADMIN — ACETAMINOPHEN 650 MG: 325 TABLET, FILM COATED ORAL at 19:27

## 2022-10-18 RX ADMIN — ACETAMINOPHEN 650 MG: 325 TABLET, FILM COATED ORAL at 12:54

## 2022-10-18 RX ADMIN — ACETAMINOPHEN 650 MG: 325 TABLET, FILM COATED ORAL at 22:04

## 2022-10-18 RX ADMIN — IBUPROFEN 400 MG: 400 TABLET, FILM COATED ORAL at 22:05

## 2022-10-18 RX ADMIN — SODIUM CHLORIDE, PRESERVATIVE FREE 10 ML: 5 INJECTION INTRAVENOUS at 08:39

## 2022-10-18 RX ADMIN — BACITRACIN ZINC: 500 OINTMENT TOPICAL at 22:07

## 2022-10-18 RX ADMIN — ENOXAPARIN SODIUM 30 MG: 100 INJECTION SUBCUTANEOUS at 22:05

## 2022-10-18 RX ADMIN — OXYCODONE HYDROCHLORIDE 10 MG: 10 TABLET ORAL at 16:49

## 2022-10-18 RX ADMIN — ENOXAPARIN SODIUM 30 MG: 100 INJECTION SUBCUTANEOUS at 08:35

## 2022-10-18 RX ADMIN — IBUPROFEN 400 MG: 400 TABLET, FILM COATED ORAL at 16:49

## 2022-10-18 RX ADMIN — BACITRACIN ZINC: 500 OINTMENT TOPICAL at 08:39

## 2022-10-18 ASSESSMENT — PAIN DESCRIPTION - LOCATION
LOCATION: LEG
LOCATION: FACE
LOCATION: LEG

## 2022-10-18 ASSESSMENT — PAIN DESCRIPTION - FREQUENCY
FREQUENCY: CONTINUOUS

## 2022-10-18 ASSESSMENT — PAIN - FUNCTIONAL ASSESSMENT
PAIN_FUNCTIONAL_ASSESSMENT: PREVENTS OR INTERFERES SOME ACTIVE ACTIVITIES AND ADLS
PAIN_FUNCTIONAL_ASSESSMENT: PREVENTS OR INTERFERES SOME ACTIVE ACTIVITIES AND ADLS
PAIN_FUNCTIONAL_ASSESSMENT: PREVENTS OR INTERFERES WITH MANY ACTIVE NOT PASSIVE ACTIVITIES
PAIN_FUNCTIONAL_ASSESSMENT: PREVENTS OR INTERFERES SOME ACTIVE ACTIVITIES AND ADLS

## 2022-10-18 ASSESSMENT — PAIN DESCRIPTION - ONSET
ONSET: ON-GOING

## 2022-10-18 ASSESSMENT — PAIN DESCRIPTION - ORIENTATION
ORIENTATION: LEFT;UPPER
ORIENTATION: RIGHT

## 2022-10-18 ASSESSMENT — PAIN SCALES - GENERAL
PAINLEVEL_OUTOF10: 5
PAINLEVEL_OUTOF10: 7
PAINLEVEL_OUTOF10: 5
PAINLEVEL_OUTOF10: 0
PAINLEVEL_OUTOF10: 6
PAINLEVEL_OUTOF10: 7
PAINLEVEL_OUTOF10: 4
PAINLEVEL_OUTOF10: 7
PAINLEVEL_OUTOF10: 4

## 2022-10-18 ASSESSMENT — PAIN DESCRIPTION - PAIN TYPE
TYPE: ACUTE PAIN

## 2022-10-18 ASSESSMENT — PAIN DESCRIPTION - DESCRIPTORS
DESCRIPTORS: ACHING;DISCOMFORT;DULL
DESCRIPTORS: ACHING;DISCOMFORT;SORE
DESCRIPTORS: ACHING;DISCOMFORT;DULL
DESCRIPTORS: ACHING;DISCOMFORT;DULL
DESCRIPTORS: ACHING;DISCOMFORT;SORE
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
## 2022-10-18 NOTE — CARE COORDINATION
10/18/2022 social work transition of care planning  Pt plan is to [facility]. Will need neg covid 24 hours within discharge.   Electronically signed by QUINTEN Atwood on 10/18/2022 at 10:23 AM

## 2022-10-19 VITALS
DIASTOLIC BLOOD PRESSURE: 66 MMHG | HEART RATE: 67 BPM | BODY MASS INDEX: 21.56 KG/M2 | WEIGHT: 154 LBS | TEMPERATURE: 97.8 F | HEIGHT: 71 IN | SYSTOLIC BLOOD PRESSURE: 109 MMHG | RESPIRATION RATE: 18 BRPM | OXYGEN SATURATION: 98 %

## 2022-10-19 LAB
ANION GAP SERPL CALCULATED.3IONS-SCNC: 11 MMOL/L (ref 7–16)
BASOPHILS ABSOLUTE: 0.04 E9/L (ref 0–0.2)
BASOPHILS RELATIVE PERCENT: 0.6 % (ref 0–2)
BUN BLDV-MCNC: 25 MG/DL (ref 6–20)
CALCIUM SERPL-MCNC: 9.3 MG/DL (ref 8.6–10.2)
CHLORIDE BLD-SCNC: 99 MMOL/L (ref 98–107)
CO2: 25 MMOL/L (ref 22–29)
CREAT SERPL-MCNC: 0.8 MG/DL (ref 0.7–1.2)
EOSINOPHILS ABSOLUTE: 0.22 E9/L (ref 0.05–0.5)
EOSINOPHILS RELATIVE PERCENT: 3.2 % (ref 0–6)
GFR SERPL CREATININE-BSD FRML MDRD: >60 ML/MIN/1.73
GLUCOSE BLD-MCNC: 99 MG/DL (ref 74–99)
HCT VFR BLD CALC: 41.3 % (ref 37–54)
HEMOGLOBIN: 14.1 G/DL (ref 12.5–16.5)
IMMATURE GRANULOCYTES #: 0.03 E9/L
IMMATURE GRANULOCYTES %: 0.4 % (ref 0–5)
LYMPHOCYTES ABSOLUTE: 1.64 E9/L (ref 1.5–4)
LYMPHOCYTES RELATIVE PERCENT: 23.9 % (ref 20–42)
MCH RBC QN AUTO: 30.1 PG (ref 26–35)
MCHC RBC AUTO-ENTMCNC: 34.1 % (ref 32–34.5)
MCV RBC AUTO: 88.2 FL (ref 80–99.9)
MONOCYTES ABSOLUTE: 0.65 E9/L (ref 0.1–0.95)
MONOCYTES RELATIVE PERCENT: 9.5 % (ref 2–12)
NEUTROPHILS ABSOLUTE: 4.29 E9/L (ref 1.8–7.3)
NEUTROPHILS RELATIVE PERCENT: 62.4 % (ref 43–80)
PDW BLD-RTO: 11.5 FL (ref 11.5–15)
PLATELET # BLD: 180 E9/L (ref 130–450)
PMV BLD AUTO: 9.2 FL (ref 7–12)
POTASSIUM REFLEX MAGNESIUM: 4 MMOL/L (ref 3.5–5)
RBC # BLD: 4.68 E12/L (ref 3.8–5.8)
SODIUM BLD-SCNC: 135 MMOL/L (ref 132–146)
WBC # BLD: 6.9 E9/L (ref 4.5–11.5)

## 2022-10-19 PROCEDURE — 36415 COLL VENOUS BLD VENIPUNCTURE: CPT

## 2022-10-19 PROCEDURE — 80048 BASIC METABOLIC PNL TOTAL CA: CPT

## 2022-10-19 PROCEDURE — 99238 HOSP IP/OBS DSCHRG MGMT 30/<: CPT | Performed by: SURGERY

## 2022-10-19 PROCEDURE — 2580000003 HC RX 258: Performed by: STUDENT IN AN ORGANIZED HEALTH CARE EDUCATION/TRAINING PROGRAM

## 2022-10-19 PROCEDURE — 6360000002 HC RX W HCPCS: Performed by: SURGERY

## 2022-10-19 PROCEDURE — 6370000000 HC RX 637 (ALT 250 FOR IP): Performed by: STUDENT IN AN ORGANIZED HEALTH CARE EDUCATION/TRAINING PROGRAM

## 2022-10-19 PROCEDURE — 97535 SELF CARE MNGMENT TRAINING: CPT

## 2022-10-19 PROCEDURE — 6370000000 HC RX 637 (ALT 250 FOR IP): Performed by: CLINICAL NURSE SPECIALIST

## 2022-10-19 PROCEDURE — 97530 THERAPEUTIC ACTIVITIES: CPT

## 2022-10-19 PROCEDURE — 85025 COMPLETE CBC W/AUTO DIFF WBC: CPT

## 2022-10-19 RX ORDER — DIAPER,BRIEF,INFANT-TODD,DISP
EACH MISCELLANEOUS
Qty: 30 G | Refills: 1 | Status: SHIPPED | OUTPATIENT
Start: 2022-10-19 | End: 2022-10-29

## 2022-10-19 RX ORDER — BACITRACIN ZINC AND POLYMYXIN B SULFATE 500; 10000 [USP'U]/G; [USP'U]/G
OINTMENT OPHTHALMIC
Qty: 1 EACH | Refills: 0 | Status: SHIPPED | OUTPATIENT
Start: 2022-10-19 | End: 2022-10-29

## 2022-10-19 RX ORDER — POLYETHYLENE GLYCOL 3350 17 G/17G
17 POWDER, FOR SOLUTION ORAL DAILY
Qty: 527 G | Refills: 1 | Status: SHIPPED | OUTPATIENT
Start: 2022-10-20 | End: 2022-11-19

## 2022-10-19 RX ORDER — METHOCARBAMOL 500 MG/1
1000 TABLET, FILM COATED ORAL 4 TIMES DAILY
Qty: 80 TABLET | Refills: 0 | Status: SHIPPED | OUTPATIENT
Start: 2022-10-19 | End: 2022-10-29

## 2022-10-19 RX ORDER — OXYCODONE HYDROCHLORIDE 5 MG/1
5 TABLET ORAL EVERY 6 HOURS PRN
Qty: 28 TABLET | Refills: 0 | Status: SHIPPED | OUTPATIENT
Start: 2022-10-19 | End: 2022-10-26

## 2022-10-19 RX ADMIN — SODIUM CHLORIDE, PRESERVATIVE FREE 10 ML: 5 INJECTION INTRAVENOUS at 09:28

## 2022-10-19 RX ADMIN — IBUPROFEN 400 MG: 400 TABLET, FILM COATED ORAL at 09:25

## 2022-10-19 RX ADMIN — ACETAMINOPHEN 650 MG: 325 TABLET, FILM COATED ORAL at 06:34

## 2022-10-19 RX ADMIN — OXYCODONE HYDROCHLORIDE 10 MG: 10 TABLET ORAL at 16:01

## 2022-10-19 RX ADMIN — OXYCODONE HYDROCHLORIDE 10 MG: 10 TABLET ORAL at 10:58

## 2022-10-19 RX ADMIN — METHOCARBAMOL TABLETS 1000 MG: 500 TABLET, COATED ORAL at 09:25

## 2022-10-19 RX ADMIN — BACITRACIN ZINC: 500 OINTMENT TOPICAL at 14:34

## 2022-10-19 RX ADMIN — METHOCARBAMOL TABLETS 1000 MG: 500 TABLET, COATED ORAL at 14:35

## 2022-10-19 RX ADMIN — OXYCODONE HYDROCHLORIDE 10 MG: 10 TABLET ORAL at 06:34

## 2022-10-19 RX ADMIN — BACITRACIN ZINC: 500 OINTMENT TOPICAL at 09:26

## 2022-10-19 RX ADMIN — IBUPROFEN 400 MG: 400 TABLET, FILM COATED ORAL at 14:35

## 2022-10-19 RX ADMIN — ENOXAPARIN SODIUM 30 MG: 100 INJECTION SUBCUTANEOUS at 09:25

## 2022-10-19 RX ADMIN — OXYCODONE HYDROCHLORIDE 10 MG: 10 TABLET ORAL at 02:29

## 2022-10-19 RX ADMIN — ACETAMINOPHEN 650 MG: 325 TABLET, FILM COATED ORAL at 14:35

## 2022-10-19 ASSESSMENT — PAIN DESCRIPTION - ORIENTATION
ORIENTATION: RIGHT

## 2022-10-19 ASSESSMENT — PAIN SCALES - GENERAL
PAINLEVEL_OUTOF10: 7
PAINLEVEL_OUTOF10: 5
PAINLEVEL_OUTOF10: 7

## 2022-10-19 ASSESSMENT — PAIN DESCRIPTION - PAIN TYPE
TYPE: ACUTE PAIN

## 2022-10-19 ASSESSMENT — PAIN DESCRIPTION - FREQUENCY
FREQUENCY: INTERMITTENT
FREQUENCY: CONTINUOUS
FREQUENCY: INTERMITTENT
FREQUENCY: CONTINUOUS

## 2022-10-19 ASSESSMENT — PAIN DESCRIPTION - ONSET
ONSET: GRADUAL
ONSET: GRADUAL
ONSET: AWAKENED FROM SLEEP
ONSET: AWAKENED FROM SLEEP

## 2022-10-19 ASSESSMENT — PAIN DESCRIPTION - LOCATION
LOCATION: LEG;HIP;RIB CAGE
LOCATION: LEG
LOCATION: LEG
LOCATION: LEG;HIP;RIB CAGE
LOCATION: LEG

## 2022-10-19 ASSESSMENT — PAIN - FUNCTIONAL ASSESSMENT
PAIN_FUNCTIONAL_ASSESSMENT: ACTIVITIES ARE NOT PREVENTED
PAIN_FUNCTIONAL_ASSESSMENT: PREVENTS OR INTERFERES SOME ACTIVE ACTIVITIES AND ADLS
PAIN_FUNCTIONAL_ASSESSMENT: ACTIVITIES ARE NOT PREVENTED

## 2022-10-19 ASSESSMENT — PAIN DESCRIPTION - DESCRIPTORS
DESCRIPTORS: ACHING;DISCOMFORT;THROBBING
DESCRIPTORS: ACHING;DISCOMFORT;NAGGING
DESCRIPTORS: ACHING;DISCOMFORT;THROBBING

## 2022-10-19 NOTE — PLAN OF CARE
Problem: Discharge Planning  Goal: Discharge to home or other facility with appropriate resources  Outcome: Progressing     Problem: Skin/Tissue Integrity  Goal: Absence of new skin breakdown  Description: 1. Monitor for areas of redness and/or skin breakdown  2. Assess vascular access sites hourly  3. Every 4-6 hours minimum:  Change oxygen saturation probe site  4. Every 4-6 hours:  If on nasal continuous positive airway pressure, respiratory therapy assess nares and determine need for appliance change or resting period.   Outcome: Progressing     Problem: ABCDS Injury Assessment  Goal: Absence of physical injury  Outcome: Progressing     Problem: Pain  Goal: Verbalizes/displays adequate comfort level or baseline comfort level  Outcome: Progressing     Problem: Safety - Adult  Goal: Free from fall injury  Outcome: Progressing     Problem: Skin/Tissue Integrity - Adult  Goal: Incisions, wounds, or drain sites healing without S/S of infection  Outcome: Progressing     Problem: Musculoskeletal - Adult  Goal: Return mobility to safest level of function  Outcome: Not Progressing  Goal: Maintain proper alignment of affected body part  Outcome: Progressing  Goal: Return ADL status to a safe level of function  Outcome: Not Progressing     Problem: Infection - Adult  Goal: Absence of infection at discharge  Outcome: Progressing
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
## 2022-10-19 NOTE — PROGRESS NOTES
Discharge instructions given. All questions answered. Patient calling for his transportation. States ride will be here in 1 hour.
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
## 2022-10-19 NOTE — DISCHARGE INSTR - COC
{P DME YIFO:675979303}  Feeding  {CHP DME OTU}  Med Admin  {[facility] DME NUTW:463074436}  Med Delivery   { JEANETTE MED Delivery:728964152}    Wound Care Documentation and Therapy:        Elimination:  Continence: Bowel: {YES / FY:83230}  Bladder: {YES / LI:20039}  Urinary Catheter: {Urinary Catheter:363071397}   Colostomy/Ileostomy/Ileal Conduit: {YES / IC:08037}       Date of Last BM: ***    Intake/Output Summary (Last 24 hours) at 10/19/2022 1218  Last data filed at 10/18/2022 2204  Gross per 24 hour   Intake 410 ml   Output --   Net 410 ml     I/O last 3 completed shifts: In: 410 [P.O.:400;  I.V.:10]  Out: -     Safety Concerns:     [address] [employer] Safety Concerns:030656327}    Impairments/Disabilities:      [address] [employer] Impairments/Disabilities:090095805}    Nutrition Therapy:  Current Nutrition Therapy:   [employer] Diet List:213871169}    Routes of Feeding: {[facility] DME Other Feedings:998852053}  Liquids: {Slp liquid thickness:39835}  Daily Fluid Restriction: {P DME Yes amt example:216294262}  Last Modified Barium Swallow with Video (Video Swallowing Test): {Done Not Done WHHO:728343795}    Treatments at the Time of Hospital Discharge:   Respiratory Treatments: ***  Oxygen Therapy:  {Therapy; copd oxygen:50441}  Ventilator:    { CC Vent SSCF:263024475}    Rehab Therapies: {THERAPEUTIC INTERVENTION:6092252729}  Weight Bearing Status/Restrictions: [employer] Weight Bearin}  Other Medical Equipment (for information only, NOT a DME order):  {EQUIPMENT:932159722}  Other Treatments: ***    Patient's personal belongings (please select all that are sent with patient):  {[facility] DME Belongings:374604856}    RN SIGNATURE:  {Esignature:005876454}    CASE MANAGEMENT/SOCIAL WORK SECTION    Inpatient Status Date: ***    Readmission Risk Assessment Score:  Readmission Risk              Risk of Unplanned Readmission:  6           Discharging to Facility/ Agency   Name:   Address:  Phone:  Fax:    Dialysis Facility (if applicable) Name:  Address:  Dialysis Schedule:  Phone:  Fax:    / signature: {Esignature:117390359}    PHYSICIAN SECTION    Prognosis: {Prognosis:3932547914}    Condition at Discharge: Ricarda Lyles Patient Condition:630526232}    Rehab Potential (if transferring to Rehab): {Prognosis:3862460601}    Recommended Labs or Other Treatments After Discharge: ***    Physician Certification: I certify the above information and transfer of Hannah Hernandez  is necessary for the continuing treatment of the diagnosis listed and that he requires {Admit to Appropriate Level of Care:90219} for {GREATER/LESS:312178025} 30 days.      Update Admission H&P: {CHP DME Changes in DVIA}    PHYSICIAN SIGNATURE:  {Esignature:337651033}
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
## 2022-10-25 ENCOUNTER — TELEPHONE (OUTPATIENT)
Dept: ORTHOPEDIC SURGERY | Age: 32
End: 2022-10-25
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
## 2022-10-27 ENCOUNTER — TELEPHONE (OUTPATIENT)
Dept: SURGERY | Age: 32
End: 2022-10-27

## 2022-10-27 NOTE — TELEPHONE ENCOUNTER
Patient is scheduled for follow up tomorrow 10/28 with Trauma Clinic. MA informed patient when he comes in for her follow up tomorrow they will discuss pain control and medication. Patient understood. Electronically signed by Kenneth Darby MA on 10/27/22 at 11:23 AM EDT

## 2022-10-27 NOTE — TELEPHONE ENCOUNTER
----- Message from Marjorie Barthel, RN sent at 10/27/2022  9:29 AM EDT -----  Regarding: Pain med refill  Patient called our office and left a message requesting a refill of pain medication. We have not seen patient as of yet. He was last prescribed pain medication by Lauren Prakash MD. Patient can be contacted at [phone].

## 2022-10-28 ENCOUNTER — HOSPITAL ENCOUNTER (OUTPATIENT)
Dept: GENERAL RADIOLOGY | Age: 32
Discharge: HOME | End: 2022-10-30
Payer: COMMERCIAL

## 2022-10-28 ENCOUNTER — OFFICE VISIT (OUTPATIENT)
Dept: SURGERY | Age: 32
End: 2022-10-28
Payer: COMMERCIAL

## 2022-10-28 ENCOUNTER — OFFICE VISIT (OUTPATIENT)
Dept: ORTHOPEDIC SURGERY | Age: 32
End: 2022-10-28
Payer: COMMERCIAL

## 2022-10-28 VITALS — BODY MASS INDEX: 21 KG/M2 | HEIGHT: 71 IN | WEIGHT: 150 LBS

## 2022-10-28 VITALS — BODY MASS INDEX: 21.48 KG/M2 | HEIGHT: 71 IN

## 2022-10-28 DIAGNOSIS — S32.444A CLOSED NONDISPLACED FRACTURE OF POSTERIOR COLUMN OF RIGHT ACETABULUM, INITIAL ENCOUNTER (HCC): ICD-10-CM

## 2022-10-28 DIAGNOSIS — S72.001A CLOSED FRACTURE DISLOCATION OF RIGHT HIP JOINT, INITIAL ENCOUNTER (HCC): ICD-10-CM

## 2022-10-28 DIAGNOSIS — S32.444A CLOSED NONDISPLACED FRACTURE OF POSTERIOR COLUMN OF RIGHT ACETABULUM, INITIAL ENCOUNTER (HCC): Primary | ICD-10-CM

## 2022-10-28 DIAGNOSIS — S06.0X1D CONCUSSION WITH LOSS OF CONSCIOUSNESS OF 30 MINUTES OR LESS, SUBSEQUENT ENCOUNTER: ICD-10-CM
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
## 2022-10-28 PROCEDURE — 72190 X-RAY EXAM OF PELVIS: CPT

## 2022-10-28 PROCEDURE — 99213 OFFICE O/P EST LOW 20 MIN: CPT | Performed by: ORTHOPAEDIC SURGERY

## 2022-10-28 PROCEDURE — 99213 OFFICE O/P EST LOW 20 MIN: CPT | Performed by: NURSE PRACTITIONER

## 2022-10-28 PROCEDURE — 99202 OFFICE O/P NEW SF 15 MIN: CPT | Performed by: ORTHOPAEDIC SURGERY

## 2022-10-28 PROCEDURE — 99212 OFFICE O/P EST SF 10 MIN: CPT | Performed by: NURSE PRACTITIONER

## 2022-10-28 RX ORDER — ASPIRIN 325 MG
325 TABLET, DELAYED RELEASE (ENTERIC COATED) ORAL 2 TIMES DAILY
Qty: 60 TABLET | Refills: 11 | Status: SHIPPED | OUTPATIENT
Start: 2022-10-28 | End: 2023-10-28

## 2022-10-28 RX ORDER — OXYCODONE HYDROCHLORIDE AND ACETAMINOPHEN 5; 325 MG/1; MG/1
1 TABLET ORAL EVERY 6 HOURS PRN
Qty: 20 TABLET | Refills: 0 | Status: SHIPPED | OUTPATIENT
Start: 2022-10-28 | End: 2022-11-02
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
## 2022-10-28 NOTE — PATIENT INSTRUCTIONS
[facility]) [facility]/Trauma Services  Additional Discharge Instructions    Call [phone] for any questions/concerns         Call for any of the following or for questions/concerns:   Fever over 101° F    Redness, swelling, hardness or warmth at the wound site (s)  Unrelieved nausea/vomiting    Foul smelling or cloudy drainage at the wound site (s)   Unrelieved pain or increase in pain     Increase in shortness of breath

## 2022-10-28 NOTE — LETTER
[address]  [address]  [address] [facility] [address]  Phone: [phone]  Fax: [phone]    Don Zheng MD        October 28, 2022     Patient: Chester Butt   YOB: 1990   Date of Visit: 10/28/2022       To Whom It May Concern: It is my medical opinion that Jose Roberto Form should remain out of work until 11/14/22. If you have any questions or concerns, please don't hesitate to call.     Sincerely,        Don Zheng MD
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
## 2022-10-28 NOTE — PATIENT INSTRUCTIONS
Toe touch weight bearing right lower extremity  Posterior hip precautions  Take medications as prescribed  Return to clinic in 2 weeks for repeat evaluation and xrays  Call the clinic if you have any questions or concerns prior to your visit

## 2022-10-28 NOTE — PROGRESS NOTES
MA removed 4 sutures from patient left elbow and 2 sutures from left hand without difficulty.   Electronically signed by Zehra Henry MA on 10/28/22 at 11:06 AM EDT

## 2022-11-04 ENCOUNTER — TELEPHONE (OUTPATIENT)
Dept: ORTHOPEDIC SURGERY | Age: 32
End: 2022-11-04

## 2022-11-04 DIAGNOSIS — S32.444A CLOSED NONDISPLACED FRACTURE OF POSTERIOR COLUMN OF RIGHT ACETABULUM, INITIAL ENCOUNTER (HCC): Primary | ICD-10-CM

## 2022-11-04 NOTE — TELEPHONE ENCOUNTER
Spoke with patient. During his last ov on 10-, copy of FMLA forms were provided to our office to fill out and fax to #[phone], Sherry Gee. Upon further review, patient presented wrong portion of FMLA form for our office to fill out since we received the Notice of Eligibility (this is for the patient to keep). Advised patient to contact his employer for a new set of forms to provide to our office during his next ov on 11-9-2022. Patient verbalized understanding.

## 2022-11-09 NOTE — TELEPHONE ENCOUNTER
Pt cancelled his ED follow up for today as he does not have any Transportation. He needs to reschedule and get his FMLA forms scheduled.    f/u [facility] fx; TTS((ED 10/12) *Tia Gene has FMLA forms*

## 2022-11-09 NOTE — TELEPHONE ENCOUNTER
Call to pt r/s'd appt   Future Appointments   Date Time Provider Mc Talley   11/16/2022 11:45 AM Santiago Ferraro MD  Ortho [facility]     Routing to [place] to contact patient re:  MILI quezada

## 2022-11-16 ENCOUNTER — HOSPITAL ENCOUNTER (OUTPATIENT)
Dept: GENERAL RADIOLOGY | Age: 32
Discharge: HOME | End: 2022-11-18
Payer: COMMERCIAL

## 2022-11-16 ENCOUNTER — OFFICE VISIT (OUTPATIENT)
Dept: ORTHOPEDIC SURGERY | Age: 32
End: 2022-11-16
Payer: COMMERCIAL

## 2022-11-16 VITALS — HEIGHT: 71 IN | WEIGHT: 150 LBS | BODY MASS INDEX: 21 KG/M2

## 2022-11-16 DIAGNOSIS — S32.444D CLOSED NONDISPLACED FRACTURE OF POSTERIOR COLUMN OF RIGHT ACETABULUM WITH ROUTINE HEALING, SUBSEQUENT ENCOUNTER: Primary | ICD-10-CM

## 2022-11-16 DIAGNOSIS — S72.001D CLOSED FRACTURE DISLOCATION OF RIGHT HIP JOINT WITH ROUTINE HEALING, SUBSEQUENT ENCOUNTER: ICD-10-CM

## 2022-11-16 DIAGNOSIS — S32.444A CLOSED NONDISPLACED FRACTURE OF POSTERIOR COLUMN OF RIGHT ACETABULUM, INITIAL ENCOUNTER (HCC): ICD-10-CM

## 2022-11-16 PROCEDURE — 99024 POSTOP FOLLOW-UP VISIT: CPT | Performed by: PHYSICIAN ASSISTANT

## 2022-11-16 PROCEDURE — 72190 X-RAY EXAM OF PELVIS: CPT

## 2022-11-16 PROCEDURE — 99212 OFFICE O/P EST SF 10 MIN: CPT
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
## 2022-12-14 ENCOUNTER — TELEPHONE (OUTPATIENT)
Dept: ADMINISTRATIVE | Age: 32
End: 2022-12-14

## 2022-12-14 DIAGNOSIS — S72.001D CLOSED FRACTURE DISLOCATION OF RIGHT HIP JOINT WITH ROUTINE HEALING, SUBSEQUENT ENCOUNTER: Primary | ICD-10-CM

## 2022-12-14 DIAGNOSIS — S32.444D CLOSED NONDISPLACED FRACTURE OF POSTERIOR COLUMN OF RIGHT ACETABULUM WITH ROUTINE HEALING, SUBSEQUENT ENCOUNTER: ICD-10-CM

## 2022-12-15 NOTE — TELEPHONE ENCOUNTER
Yes, we can refer him to another orthopedic surgeon closer to [facility]. See attached referral. Would recommend patient to call insurance and see who is within network for him and we can provide recommendations.

## 2022-12-16 NOTE — TELEPHONE ENCOUNTER
Call to pt advises referral placed to Dr. Avery Barnes. Advised to contact insurance to verify provider is  in your network. Pt verbalized understanding and is in agreement with the plan.

## 2022-12-19 DIAGNOSIS — S72.001D CLOSED FRACTURE DISLOCATION OF RIGHT HIP JOINT WITH ROUTINE HEALING, SUBSEQUENT ENCOUNTER: Primary | ICD-10-CM

## 2022-12-27 ENCOUNTER — OFFICE VISIT (OUTPATIENT)
Dept: ORTHOPEDIC SURGERY | Age: 32
End: 2022-12-27
Payer: COMMERCIAL

## 2022-12-27 VITALS — WEIGHT: 150 LBS | HEIGHT: 71 IN | BODY MASS INDEX: 21 KG/M2 | TEMPERATURE: 98 F

## 2022-12-27 DIAGNOSIS — S72.001D CLOSED FRACTURE DISLOCATION OF RIGHT HIP JOINT WITH ROUTINE HEALING, SUBSEQUENT ENCOUNTER: Primary | ICD-10-CM

## 2022-12-27 PROCEDURE — 99213 OFFICE O/P EST LOW 20 MIN: CPT | Performed by: ORTHOPAEDIC SURGERY

## 2022-12-27 NOTE — PROGRESS NOTES
Chief Complaint   Patient presents with    Hip Injury         HPI:    Patient is 28 y.o. male complaining of Right hip dislocation resulting from [address] 10/12/22. Patient was seeing ortho trauma but wants a new provider due to transportation issues. Hip has been improving. He is able to ambulate but has some pain. Has a little bit of Pt but he believes he was going to be doing more after this visit. ROS:    Skin: (-) rash,(-) psoriasis,(-) eczema, (-)skin cancer. Neurologic: (-)numbness, (-)tingling, (-)headaches, (-) LOC. Cardiovascular: (-) Chest pain, (-) swelling in legs/feet, (-) SOB, (-) cramping in legs/feet with walking. All other review of systems negative except stated above or in HPI      Past Medical History:   Diagnosis Date    Bee sting allergy     Raynaud phenomenon     Sciatica      No past surgical history on file.     Current Outpatient Medications:     aspirin 325 MG EC tablet, Take 1 tablet by mouth in the morning and at bedtime, Disp: 60 tablet, Rfl: 11  Allergies   Allergen Reactions    Bee Venom Anaphylaxis    Bee Venom      Social History     Socioeconomic History    Marital status:      Spouse name: Not on file    Number of children: Not on file    Years of education: Not on file    Highest education level: Not on file   Occupational History    Not on file   Tobacco Use    Smoking status: Every Day     Packs/day: 0.50     Years: 8.00     Pack years: 4.00     Types: Cigarettes    Smokeless tobacco: Never   Substance and Sexual Activity    Alcohol use: Yes    Drug use: Yes     Types: Marijuana (Weed)     Comment: quit drugs 2018    Sexual activity: Yes     Partners: Female   Other Topics Concern    Not on file   Social History Narrative    ** Merged History Encounter **          Social Determinants of Health     Financial Resource Strain: Not on file   Food Insecurity: Not on file   Transportation Needs: Not on file   Physical Activity: Not on file   Stress: Not on file   Social Connections: Not on file   Intimate Partner Violence: Not on file   Housing Stability: Not on file     No family history on file. Physical Exam:    Temp 98 °F (36.7 °C)   Ht 5' 11\" (1.803 m)   Wt 150 lb (68 kg)   BMI 20.92 kg/m²     GENERAL: alert, appears stated age, cooperative, no acute distress    HEENT: Head is normocephalic, atraumatic. PERRLA. SKIN: Clean, dry, intact. There is not any cellulitis or cutaneous lesions noted in the upper extremities    PULMONARY: breathing is regular and unlabored, no acute distress    CV: The bilateral upper and lower extremities are warm and well-perfused with brisk capillary refill. 2+ pulses UE and LE bilateral.     PSYCHIATRY: Pleasant mood, appropriate behavior, follows commands    NEURO: Sensation is intact distally with light touch with no alteration. Motor exam of the upper extremities show elbow flexion and extension, wrist flexion and extension, and finger abduction grossly intact 5/5. Upper extremity reflexes are bilaterally symmetrical and within normal limits. LYMPH: No lymphedema present distally in upper or lower extremity. MUSCULOSKELETAL:  Examination of the right hip reveal negative C sign. Mild pain with internal/external rotation in the groin. Tender palpation greater trochanteric ridge. No antalgic gait however. Tender palpation SI joint as well. Exam is benign with full range of motion 0 to 40 degrees no varus valgus instability. There is no limb length discrepancy. Imaging:  XR HIP RIGHT (2-3 VIEWS)    Result Date: 12/28/2022  EXAMINATION: TWO XRAY VIEWS OF THE RIGHT HIP 12/27/2022 3:16 pm COMPARISON: The previous study performed 11/16/2022. HISTORY: ORDERING SYSTEM PROVIDED HISTORY: Closed fracture dislocation of right hip joint with routine healing, subsequent encounter FINDINGS: Again identified is the fracture of the right acetabulum. It is without significant interval change in alignment and position.   No significant interval healing changes/callus formation are identified. No new osseous or surrounding soft tissue abnormality is appreciated. No significant interval change, when compared to the previous study performed 11/16/2022. Leo Blakely was seen today for hip injury. Diagnoses and all orders for this visit:    Closed fracture dislocation of right hip joint with routine healing, subsequent encounter  -     XR HIP RIGHT (2-3 VIEWS); Future      Patient seen and examined. Imaging reviewed with patient in detail. Natural history and course discussed with patient in long discussion  Treatment options discussed with patient in detail including risks and benefits. Patient should do well with conservative management as patient would like to avoid surgery at this time. Continue with protected weightbearing with assistance. Follow-up in 4 weeks for recheck.           Indra Ryan, DO

## 2023-01-16 DIAGNOSIS — S72.001D CLOSED FRACTURE DISLOCATION OF RIGHT HIP JOINT WITH ROUTINE HEALING, SUBSEQUENT ENCOUNTER: Primary | ICD-10-CM

## 2023-01-18 ENCOUNTER — OFFICE VISIT (OUTPATIENT)
Dept: ORTHOPEDIC SURGERY | Age: 33
End: 2023-01-18
Payer: COMMERCIAL

## 2023-01-18 VITALS — TEMPERATURE: 98 F | BODY MASS INDEX: 21 KG/M2 | HEIGHT: 71 IN | WEIGHT: 150 LBS

## 2023-01-18 DIAGNOSIS — S32.444D CLOSED NONDISPLACED FRACTURE OF POSTERIOR COLUMN OF RIGHT ACETABULUM WITH ROUTINE HEALING, SUBSEQUENT ENCOUNTER: ICD-10-CM

## 2023-01-18 DIAGNOSIS — S72.001D CLOSED FRACTURE DISLOCATION OF RIGHT HIP JOINT WITH ROUTINE HEALING, SUBSEQUENT ENCOUNTER: Primary | ICD-10-CM

## 2023-01-18 PROCEDURE — 99213 OFFICE O/P EST LOW 20 MIN: CPT | Performed by: ORTHOPAEDIC SURGERY

## 2023-01-18 NOTE — PROGRESS NOTES
Chief Complaint   Patient presents with    Hip Pain     Right hip pain f/u doing a lot better not using crutches as much and pain has gone away in the hip. HPI:    Patient is 28 y.o. male complaining of Right hip dislocation resulting from [address] 10/12/22. Patient was seeing ortho trauma but wants a new provider due to transportation issues. Hip has been improving. He is able to ambulate and has no pain. He has discontinued crutches but still feels weak due to being nonweightbearing. ROS:    Skin: (-) rash,(-) psoriasis,(-) eczema, (-)skin cancer. Neurologic: (-)numbness, (-)tingling, (-)headaches, (-) LOC. Cardiovascular: (-) Chest pain, (-) swelling in legs/feet, (-) SOB, (-) cramping in legs/feet with walking. All other review of systems negative except stated above or in HPI      Past Medical History:   Diagnosis Date    Bee sting allergy     Raynaud phenomenon     Sciatica      No past surgical history on file.     Current Outpatient Medications:     aspirin 325 MG EC tablet, Take 1 tablet by mouth in the morning and at bedtime, Disp: 60 tablet, Rfl: 11  Allergies   Allergen Reactions    Bee Venom Anaphylaxis    Bee Venom      Social History     Socioeconomic History    Marital status:      Spouse name: Not on file    Number of children: Not on file    Years of education: Not on file    Highest education level: Not on file   Occupational History    Not on file   Tobacco Use    Smoking status: Every Day     Packs/day: 0.50     Years: 8.00     Pack years: 4.00     Types: Cigarettes    Smokeless tobacco: Never   Substance and Sexual Activity    Alcohol use: Yes    Drug use: Yes     Types: Marijuana (Weed)     Comment: quit drugs 2018    Sexual activity: Yes     Partners: Female   Other Topics Concern    Not on file   Social History Narrative    ** Merged History Encounter **          Social Determinants of Health     Financial Resource Strain: Not on file   Food Insecurity: Not on file   Transportation Needs: Not on file   Physical Activity: Not on file   Stress: Not on file   Social Connections: Not on file   Intimate Partner Violence: Not on file   Housing Stability: Not on file     No family history on file.        Physical Exam:    Temp 98 °F (36.7 °C)   Ht 5' 11\" (1.803 m)   Wt 150 lb (68 kg)   BMI 20.92 kg/m²     GENERAL: alert, appears stated age, cooperative, no acute distress    HEENT: Head is normocephalic, atraumatic. PERRLA.      SKIN: Clean, dry, intact. There is not any cellulitis or cutaneous lesions noted in the lower extremities     PULMONARY: breathing is regular and unlabored, no acute distress     CV: The bilateral lower extremities are warm and well-perfused with brisk capillary refill. 2+ pulses LE bilateral.     ABDOMINAL: Nontender, nondistended     PSYCHIATRY: Pleasant mood, appropriate behavior, follows commands     NEURO: Sensation is intact distally with light touch with no alteration. Motor exam of the lower extremities show quadriceps, hamstrings, foot dorsiflexion and plantarflexion grossly intact 5/5.    LYMPH: No lymphedema present distally in upper or lower extremity.     MUSCULOSKELETAL:  Examination of the right hip reveal negative C sign.  Mild pain with internal/external rotation in the groin.  Tender palpation greater trochanteric ridge.  No antalgic gait however.  Tender palpation SI joint as well.  Exam is benign with full range of motion 0 to 40 degrees no varus valgus instability.  There is no limb length discrepancy.      Good improvement since last visit      Imaging:  XR HIP RIGHT (2-3 VIEWS)    Result Date: 12/28/2022  EXAMINATION: TWO XRAY VIEWS OF THE RIGHT HIP 12/27/2022 3:16 pm COMPARISON: The previous study performed 11/16/2022. HISTORY: ORDERING SYSTEM PROVIDED HISTORY: Closed fracture dislocation of right hip joint with routine healing, subsequent encounter FINDINGS: Again identified is the fracture of the right acetabulum.  It is without  significant interval change in alignment and position. No significant interval healing changes/callus formation are identified. No new osseous or surrounding soft tissue abnormality is appreciated. No significant interval change, when compared to the previous study performed 11/16/2022. XR HIP RIGHT (2-3 VIEWS)    Result Date: 1/18/2023  EXAMINATION: TWO XRAY VIEWS OF THE RIGHT HIP 1/18/2023 7:58 am COMPARISON: 27 December 2022 HISTORY: ORDERING SYSTEM PROVIDED HISTORY: Closed fracture dislocation of right hip joint with routine healing, subsequent encounter FINDINGS: There is no evidence of acute fracture. There is normal alignment. No acute joint abnormality. No focal osseous lesion. No focal soft tissue abnormality. Unremarkable pelvis and right hip. Marie James was seen today for hip pain. Diagnoses and all orders for this visit:    Closed fracture dislocation of right hip joint with routine healing, subsequent encounter  -     Amb External Referral To Physical Therapy    Closed nondisplaced fracture of posterior column of right acetabulum with routine healing, subsequent encounter  -     Amb External Referral To Physical Therapy        Patient seen and examined. Imaging reviewed with patient in detail. Natural history and course discussed with patient in long discussion  Treatment options discussed with patient in detail including risks and benefits. Patient should do well with conservative management as patient would like to avoid surgery at this time. Continue with progressive weightbearing as tolerated with assistance. Follow-up in 4 weeks for recheck.           Juventino Tomlinson DO

## 2023-02-27 DIAGNOSIS — S72.001D CLOSED FRACTURE DISLOCATION OF RIGHT HIP JOINT WITH ROUTINE HEALING, SUBSEQUENT ENCOUNTER: Primary | ICD-10-CM
